# Patient Record
Sex: MALE | Race: WHITE | Employment: FULL TIME | URBAN - METROPOLITAN AREA
[De-identification: names, ages, dates, MRNs, and addresses within clinical notes are randomized per-mention and may not be internally consistent; named-entity substitution may affect disease eponyms.]

---

## 2017-03-28 ENCOUNTER — TELEPHONE (OUTPATIENT)
Dept: FAMILY MEDICINE CLINIC | Facility: CLINIC | Age: 52
End: 2017-03-28

## 2017-03-28 NOTE — TELEPHONE ENCOUNTER
Wife called to schedule  an appointment for hemorrhoids. I explained that Dr Po Saavedra is out of the office this week. Wife wants to know if the walk in clinic could help with something like this?

## 2017-03-28 NOTE — TELEPHONE ENCOUNTER
Spoke to wife /patient is in [place]. He has painful Hemorrhoids external x 2. Advised can go to . Can also try OTC Preparation H is doing this already helping somewhat. . Take sitz bath  Th. OTC pain Rx. Get hemorrhoid pillow for plane ride.      Future Appoi

## 2017-04-02 ENCOUNTER — HOSPITAL ENCOUNTER (EMERGENCY)
Facility: HOSPITAL | Age: 52
Discharge: HOME | End: 2017-04-02
Attending: EMERGENCY MEDICINE
Payer: COMMERCIAL

## 2017-04-02 VITALS
OXYGEN SATURATION: 99 % | TEMPERATURE: 99 F | BODY MASS INDEX: 24.11 KG/M2 | DIASTOLIC BLOOD PRESSURE: 81 MMHG | SYSTOLIC BLOOD PRESSURE: 125 MMHG | RESPIRATION RATE: 16 BRPM | WEIGHT: 150 LBS | HEIGHT: 66 IN | HEART RATE: 74 BPM

## 2017-04-02 DIAGNOSIS — K64.5 THROMBOSED EXTERNAL HEMORRHOID: Primary | ICD-10-CM

## 2017-04-02 PROCEDURE — 99283 EMERGENCY DEPT VISIT LOW MDM: CPT

## 2017-04-02 PROCEDURE — 36415 COLL VENOUS BLD VENIPUNCTURE: CPT

## 2017-04-02 PROCEDURE — 85027 COMPLETE CBC AUTOMATED: CPT | Performed by: EMERGENCY MEDICINE

## 2017-04-02 NOTE — ED PROVIDER NOTES
Patient Seen in: [facility] Emergency Department    History   Patient presents with:  Rectal Problem    Stated Complaint: hemorrohids    HPI    Patient is a 68-year-old with a history of asthma and hemorrhoids who presents for evaluation of a bleedi systems are as noted in HPI. Constitutional and vital signs reviewed. All other systems reviewed and negative except as noted above. PSFH elements reviewed from today and agreed except as otherwise stated in HPI.     Physical Exam       ED Triage V 10599  [phone]    Schedule an appointment as soon as possible for a visit in 2 days        Medications Prescribed:  Discharge Medication List as of 4/2/2017  4:04 PM

## 2017-04-03 ENCOUNTER — OFFICE VISIT (OUTPATIENT)
Dept: SURGERY | Facility: CLINIC | Age: 52
End: 2017-04-03

## 2017-04-03 VITALS
WEIGHT: 152 LBS | HEART RATE: 82 BPM | HEIGHT: 66 IN | DIASTOLIC BLOOD PRESSURE: 76 MMHG | SYSTOLIC BLOOD PRESSURE: 116 MMHG | BODY MASS INDEX: 24.43 KG/M2

## 2017-04-03 DIAGNOSIS — K64.2 PROLAPSED INTERNAL HEMORRHOIDS, GRADE 3: ICD-10-CM

## 2017-04-03 DIAGNOSIS — K92.2 INTESTINAL BLEEDING: ICD-10-CM

## 2017-04-03 DIAGNOSIS — K64.5 THROMBOSED EXTERNAL HEMORRHOID: Primary | ICD-10-CM

## 2017-04-03 DIAGNOSIS — R19.5 NARROWING OF STOOLS: ICD-10-CM

## 2017-04-03 PROCEDURE — 46600 DIAGNOSTIC ANOSCOPY SPX: CPT | Performed by: COLON & RECTAL SURGERY

## 2017-04-03 PROCEDURE — 99243 OFF/OP CNSLTJ NEW/EST LOW 30: CPT | Performed by: COLON & RECTAL SURGERY

## 2017-04-03 RX ORDER — POLYETHYLENE GLYCOL 3350, SODIUM CHLORIDE, SODIUM BICARBONATE, POTASSIUM CHLORIDE 420; 11.2; 5.72; 1.48 G/4L; G/4L; G/4L; G/4L
POWDER, FOR SOLUTION ORAL
Qty: 1 BOTTLE | Refills: 0 | Status: SHIPPED | OUTPATIENT
Start: 2017-04-03 | End: 2017-10-03 | Stop reason: ALTCHOICE

## 2017-04-03 NOTE — PATIENT INSTRUCTIONS
This patient presents with intestinal bleeding, a large thrombosed hemorrhoid, and change in bowel habits. He states that he was seen in emergency department on April 2. He was noted to have a thrombosed hemorrhoid.   His symptoms preceded his ER visit has a very large prolapsed external thrombosed hemorrhoid with central necrosis. It should reduce and repair itself on its own without surgical intervention. He is already nearly asymptomatic.   He has large grade 3 internal hemorrhoids which will also re

## 2017-04-03 NOTE — H&P
New Patient Visit Note       Active Problems      1. Thrombosed external hemorrhoid    2. Prolapsed internal hemorrhoids, grade 3    3. Narrowing of stools    4.  Intestinal bleeding        Chief Complaint   Patient presents with:  Hemorrhoids: New pt. seen Known Allergies. Past Medical / Surgical / Social / Family History    The past medical and past surgical history have been reviewed by me today.     Past Medical History   Diagnosis Date   • Asthma      as child   • Nonspecific (abnormal) findings on rad fever, chills, diaphoresis, fatigue and unexpected weight change. HENT: Negative for hearing loss, nosebleeds, sore throat and trouble swallowing. Respiratory: Negative for apnea, cough, shortness of breath and wheezing.     Cardiovascular: Negative fo no fissures or fistulae. There is no evidence of anal abscess. This patient has a very large prolapsed external thrombosed hemorrhoid with central necrosis. It should reduce and repair itself on its own without surgical intervention.   He is already ne the anoderm with a central necrotic zone that has a blood clot extruding. I took the opportunity today to try to express more of the blood clot from the defect, I yielded minimal results.   He has a anoscopy revealing internal components in the left latera

## 2017-04-03 NOTE — PROCEDURES
Procedure:  Anoscopy    Surgeon: Katy Young    Anesthesia: None    Findings: See the progress note attached for all findings    Operative Summary: The patient was placed in a prone position on the proctoscopy table, the hips were flexed in the jackknife p

## 2017-07-26 ENCOUNTER — MED REC SCAN ONLY (OUTPATIENT)
Dept: FAMILY MEDICINE CLINIC | Facility: CLINIC | Age: 52
End: 2017-07-26

## 2017-09-25 ENCOUNTER — TELEPHONE (OUTPATIENT)
Dept: FAMILY MEDICINE CLINIC | Facility: CLINIC | Age: 52
End: 2017-09-25

## 2017-09-25 NOTE — TELEPHONE ENCOUNTER
Patient requests an appt for an annual physical.  I explained that his last physical was in 12/2016 and insurance doesn't usually cover it until 12/2017.   He states his employer told him if he doesn't have it done by 9/29/17, he will not qualify for discou

## 2017-09-25 NOTE — TELEPHONE ENCOUNTER
Called Pt to inform that labs were ordered last December and never completed    Pt can have these done anytime (fasting)    Pt also given # to fax us the form required by his job to see if we can complete using the physical he had last December    Will not

## 2017-09-28 ENCOUNTER — LAB ENCOUNTER (OUTPATIENT)
Dept: LAB | Age: 52
End: 2017-09-28
Attending: FAMILY MEDICINE
Payer: COMMERCIAL

## 2017-09-28 DIAGNOSIS — Z13.220 SCREENING FOR LIPOID DISORDERS: ICD-10-CM

## 2017-09-28 DIAGNOSIS — Z13.0 SCREENING FOR DEFICIENCY ANEMIA: ICD-10-CM

## 2017-09-28 DIAGNOSIS — Z13.1 SCREENING FOR DIABETES MELLITUS: ICD-10-CM

## 2017-09-28 DIAGNOSIS — R73.01 ELEVATED FASTING GLUCOSE: ICD-10-CM

## 2017-09-28 DIAGNOSIS — Z13.29 SCREENING FOR THYROID DISORDER: ICD-10-CM

## 2017-09-28 LAB
ALBUMIN SERPL-MCNC: 3.7 G/DL (ref 3.5–4.8)
ALP LIVER SERPL-CCNC: 78 U/L (ref 45–117)
ALT SERPL-CCNC: 26 U/L (ref 17–63)
AST SERPL-CCNC: 11 U/L (ref 15–41)
BASOPHILS # BLD AUTO: 0.06 X10(3) UL (ref 0–0.1)
BASOPHILS NFR BLD AUTO: 1.2 %
BILIRUB SERPL-MCNC: 1.4 MG/DL (ref 0.1–2)
BUN BLD-MCNC: 18 MG/DL (ref 8–20)
CALCIUM BLD-MCNC: 9.1 MG/DL (ref 8.3–10.3)
CHLORIDE: 106 MMOL/L (ref 101–111)
CHOLEST SMN-MCNC: 223 MG/DL (ref ?–200)
CO2: 28 MMOL/L (ref 22–32)
CREAT BLD-MCNC: 0.95 MG/DL (ref 0.7–1.3)
EOSINOPHIL # BLD AUTO: 0.14 X10(3) UL (ref 0–0.3)
EOSINOPHIL NFR BLD AUTO: 2.9 %
ERYTHROCYTE [DISTWIDTH] IN BLOOD BY AUTOMATED COUNT: 11.8 % (ref 11.5–16)
GLUCOSE BLD-MCNC: 108 MG/DL (ref 70–99)
HCT VFR BLD AUTO: 40.8 % (ref 37–53)
HDLC SERPL-MCNC: 40 MG/DL (ref 45–?)
HDLC SERPL: 5.58 {RATIO} (ref ?–4.97)
HGB BLD-MCNC: 13.8 G/DL (ref 13–17)
IMMATURE GRANULOCYTE COUNT: 0.01 X10(3) UL (ref 0–1)
IMMATURE GRANULOCYTE RATIO %: 0.2 %
LDLC SERPL CALC-MCNC: 142 MG/DL (ref ?–130)
LDLC SERPL-MCNC: 41 MG/DL (ref 5–40)
LYMPHOCYTES # BLD AUTO: 1.92 X10(3) UL (ref 0.9–4)
LYMPHOCYTES NFR BLD AUTO: 39.9 %
M PROTEIN MFR SERPL ELPH: 7 G/DL (ref 6.1–8.3)
MCH RBC QN AUTO: 30.7 PG (ref 27–33.2)
MCHC RBC AUTO-ENTMCNC: 33.8 G/DL (ref 31–37)
MCV RBC AUTO: 90.9 FL (ref 80–99)
MONOCYTES # BLD AUTO: 0.56 X10(3) UL (ref 0.1–0.6)
MONOCYTES NFR BLD AUTO: 11.6 %
NEUTROPHIL ABS PRELIM: 2.12 X10 (3) UL (ref 1.3–6.7)
NEUTROPHILS # BLD AUTO: 2.12 X10(3) UL (ref 1.3–6.7)
NEUTROPHILS NFR BLD AUTO: 44.2 %
NONHDLC SERPL-MCNC: 183 MG/DL (ref ?–130)
PLATELET # BLD AUTO: 343 10(3)UL (ref 150–450)
POTASSIUM SERPL-SCNC: 4.7 MMOL/L (ref 3.6–5.1)
RBC # BLD AUTO: 4.49 X10(6)UL (ref 4.3–5.7)
RED CELL DISTRIBUTION WIDTH-SD: 39.2 FL (ref 35.1–46.3)
SODIUM SERPL-SCNC: 140 MMOL/L (ref 136–144)
TRIGLYCERIDES: 203 MG/DL (ref ?–150)
TSI SER-ACNC: 1.18 MIU/ML (ref 0.35–5.5)
WBC # BLD AUTO: 4.8 X10(3) UL (ref 4–13)

## 2017-09-28 PROCEDURE — 84443 ASSAY THYROID STIM HORMONE: CPT

## 2017-09-28 PROCEDURE — 80061 LIPID PANEL: CPT

## 2017-09-28 PROCEDURE — 83036 HEMOGLOBIN GLYCOSYLATED A1C: CPT

## 2017-09-28 PROCEDURE — 80053 COMPREHEN METABOLIC PANEL: CPT

## 2017-09-28 PROCEDURE — 36415 COLL VENOUS BLD VENIPUNCTURE: CPT

## 2017-09-28 PROCEDURE — 85025 COMPLETE CBC W/AUTO DIFF WBC: CPT

## 2017-09-28 NOTE — TELEPHONE ENCOUNTER
Patient called to let us know he had labs drawn this morning. I told him I can see that labs were collected, but it may take up to 48 hours to get results.       I reminded him of faxing form to us and letting us know where we need to send the form once co

## 2017-09-29 ENCOUNTER — MED REC SCAN ONLY (OUTPATIENT)
Dept: FAMILY MEDICINE CLINIC | Facility: CLINIC | Age: 52
End: 2017-09-29

## 2017-09-29 DIAGNOSIS — R73.01 ELEVATED FASTING GLUCOSE: Primary | ICD-10-CM

## 2017-09-29 LAB
EST. AVERAGE GLUCOSE BLD GHB EST-MCNC: 111 MG/DL (ref 68–126)
HBA1C MFR BLD HPLC: 5.5 % (ref ?–5.7)

## 2017-09-29 NOTE — TELEPHONE ENCOUNTER
Completed form was faxed. Dr Anabella Francis waived fee because it was documented that form was presented at  in December    Pt called and notified.  Appt made for next week to discuss lab results    Future Appointments  Date Time Provider Thea Monique   10/3/2017

## 2017-09-29 NOTE — PROGRESS NOTES
Notes Recorded by Von Lynch MD on 9/29/2017 at 3:19 PM CDT  Cholesterol is better but continues to he high, glucose elevated, please add HgbA1c and also advise patient to follow up with me to discuss his labs.     A1C added

## 2017-09-29 NOTE — TELEPHONE ENCOUNTER
Called patient and LVM asking if he could fax the form to us so we can review and possibly complete. If it meets criteria, there will be the $25 form fee. If it does not meet criteria, we will let him know.

## 2017-10-03 ENCOUNTER — OFFICE VISIT (OUTPATIENT)
Dept: FAMILY MEDICINE CLINIC | Facility: CLINIC | Age: 52
End: 2017-10-03

## 2017-10-03 VITALS
HEART RATE: 78 BPM | DIASTOLIC BLOOD PRESSURE: 62 MMHG | WEIGHT: 157.13 LBS | SYSTOLIC BLOOD PRESSURE: 118 MMHG | RESPIRATION RATE: 16 BRPM | TEMPERATURE: 98 F | BODY MASS INDEX: 25.25 KG/M2 | HEIGHT: 66 IN

## 2017-10-03 DIAGNOSIS — Z82.49 FAMILY HISTORY OF HEART DISEASE: ICD-10-CM

## 2017-10-03 DIAGNOSIS — E78.00 HYPERCHOLESTEROLEMIA: Primary | ICD-10-CM

## 2017-10-03 PROCEDURE — 99213 OFFICE O/P EST LOW 20 MIN: CPT | Performed by: FAMILY MEDICINE

## 2017-10-03 RX ORDER — ROSUVASTATIN CALCIUM 5 MG/1
5 TABLET, COATED ORAL NIGHTLY
Qty: 30 TABLET | Refills: 0 | Status: SHIPPED | OUTPATIENT
Start: 2017-10-03 | End: 2017-10-31

## 2017-10-03 NOTE — PATIENT INSTRUCTIONS
Lifestyle Changes to Control Cholesterol  You can control your cholesterol through diet, exercise, weight management, quitting smoking, stress management, and taking your medicines right. These things can also lower your risk for cardiovascular disease. · Riding a bicycle or stationary bike  · Dancing  Managing your weight  If you are overweight or obese, your healthcare provider will work with you to help you lose weight and lower your BMI (body mass index).  Making diet changes and getting more physical · Don’t skip a dose or stop taking your medicine because you feel better or because your cholesterol numbers go down. Never stop taking your medicine unless your healthcare provider has told you it’s OK.   · Ask your healthcare provider if you have any ques © 5783-4940 [address], [address] [place]. All rights reserved. This information is not intended as a substitute for professional medical care. Always follow your healthcare professional's instructions.

## 2017-10-09 NOTE — PROGRESS NOTES
Carri Tellez is a 54year old male. Patient presents with:  Test Results  Other: No flu vaccine    HPI:   Patient is seen for follow-up and to discuss his labs.   Patient states exercises by doing yoga, tries to eat mostly healthy on occasion might eat a Hematocrit      37.0 - 53.0 % 40.8   Platelet Count      537.2 - 450.0 10(3)uL 343.0   MCV      80.0 - 99.0 fL 90.9   MCH      27.0 - 33.2 pg 30.7   MCHC      31.0 - 37.0 g/dL 33.8   RDW      11.5 - 16.0 % 11.8   RDW-SD      35.1 - 46.3 fL 39.2   Prelim Rosuvastatin Calcium 5 MG Oral Tab; Take 1 tablet (5 mg total) by mouth nightly. -     HEPATIC FUNCTION PANEL (7); Future  -     LIPID PANEL; Future    Family history of heart disease  -     CT CALCIUM SCORING;  Future

## 2017-10-31 DIAGNOSIS — E78.00 HYPERCHOLESTEROLEMIA: ICD-10-CM

## 2017-11-01 RX ORDER — ROSUVASTATIN CALCIUM 5 MG/1
TABLET, COATED ORAL
Qty: 90 TABLET | Refills: 1 | Status: SHIPPED | OUTPATIENT
Start: 2017-11-01 | End: 2018-05-20

## 2017-11-01 NOTE — TELEPHONE ENCOUNTER
No future appointments. LOV 10/17 Return in about 6 months (around 4/3/2018).        LAST LAB 9/17    LAST RX  Rosuvastatin Calcium 5 MG Oral Tab 30 tablet 0 10/3/2017       PROTOCOL  Cholesterol Medication Protocol Passed    Please advise did you want

## 2018-03-10 ENCOUNTER — OFFICE VISIT (OUTPATIENT)
Dept: FAMILY MEDICINE CLINIC | Facility: CLINIC | Age: 53
End: 2018-03-10

## 2018-03-10 VITALS
SYSTOLIC BLOOD PRESSURE: 118 MMHG | WEIGHT: 144 LBS | HEART RATE: 74 BPM | TEMPERATURE: 98 F | BODY MASS INDEX: 23.14 KG/M2 | HEIGHT: 66 IN | RESPIRATION RATE: 16 BRPM | DIASTOLIC BLOOD PRESSURE: 58 MMHG | OXYGEN SATURATION: 99 %

## 2018-03-10 DIAGNOSIS — L30.9 DERMATITIS: Primary | ICD-10-CM

## 2018-03-10 PROCEDURE — 99213 OFFICE O/P EST LOW 20 MIN: CPT | Performed by: FAMILY MEDICINE

## 2018-03-10 RX ORDER — MOMETASONE FUROATE 1 MG/G
1 CREAM TOPICAL 2 TIMES DAILY PRN
Qty: 45 G | Refills: 1 | Status: SHIPPED | OUTPATIENT
Start: 2018-03-10 | End: 2018-09-25 | Stop reason: ALTCHOICE

## 2018-03-10 NOTE — PATIENT INSTRUCTIONS
Apply mometasone cream to affected areas twice daily for 1-2 weeks. If no better, follow-up with PCP for further evaluation and consider dietary changes to help weed out what may be causing the reaction.

## 2018-03-10 NOTE — PROGRESS NOTES
CHIEF COMPLAINT:   Patient presents with:  Rash: itchy rash on arms , legs x 1 mo . tried otc cortisone topical ointment          HPI:   Jayy Welch is a 50year old male who presents for evaluation of a rash.   Per patient rash started in the past 1 mon as child   • Nonspecific (abnormal) findings on radiological and other examination of other intrathoracic organs       Past Surgical History:  07/18/2017: COLONOSCOPY      Comment: internal hemorrhoids and diverticula, recheck                in 10 yrs. Oropharynx moist without lesions. LUNGS: Clear to auscultation bilaterally. No wheezing, rhonchi, or rales. No diminished breath sounds. No increased work of breathing. CARDIO: RRR without murmur  LYMPH: No lymphadenopathy.      ASSESSMENT AND PLAN:

## 2018-05-20 DIAGNOSIS — E78.00 HYPERCHOLESTEROLEMIA: ICD-10-CM

## 2018-05-21 RX ORDER — ROSUVASTATIN CALCIUM 5 MG/1
TABLET, COATED ORAL
Qty: 90 TABLET | Refills: 0 | Status: SHIPPED | OUTPATIENT
Start: 2018-05-21 | End: 2018-09-10

## 2018-05-21 NOTE — TELEPHONE ENCOUNTER
LOV 10/17 No future appointments. LAST LAB 9/17    LAST RX   ROSUVASTATIN CALCIUM 5 MG Oral Tab 90 tablet 1 11/1/2017       PROTOCOL  Cholesterol Medication Protocol Passed    Refilled x 3 months.

## 2018-09-10 DIAGNOSIS — E78.00 HYPERCHOLESTEROLEMIA: ICD-10-CM

## 2018-09-10 RX ORDER — ROSUVASTATIN CALCIUM 5 MG/1
TABLET, COATED ORAL
Qty: 30 TABLET | Refills: 0 | Status: SHIPPED | OUTPATIENT
Start: 2018-09-10 | End: 2018-09-25

## 2018-09-10 NOTE — TELEPHONE ENCOUNTER
Name from pharmacy: ROSUVASTATIN 5MG TABLETS         Will file in chart as: ROSUVASTATIN CALCIUM 5 MG Oral Tab    Sig: TAKE 1 TABLET(5 MG) BY MOUTH EVERY NIGHT    Disp:  90 tablet    Refills:  0    Start: 9/10/2018     Class: Normal    For: Hypercholester

## 2018-09-25 ENCOUNTER — OFFICE VISIT (OUTPATIENT)
Dept: FAMILY MEDICINE CLINIC | Facility: CLINIC | Age: 53
End: 2018-09-25
Payer: COMMERCIAL

## 2018-09-25 VITALS
SYSTOLIC BLOOD PRESSURE: 120 MMHG | OXYGEN SATURATION: 97 % | WEIGHT: 148.25 LBS | DIASTOLIC BLOOD PRESSURE: 70 MMHG | TEMPERATURE: 97 F | BODY MASS INDEX: 23.83 KG/M2 | RESPIRATION RATE: 18 BRPM | HEIGHT: 66 IN | HEART RATE: 68 BPM

## 2018-09-25 DIAGNOSIS — J45.20 ASTHMA, MILD INTERMITTENT, WELL-CONTROLLED: ICD-10-CM

## 2018-09-25 DIAGNOSIS — Z00.00 ROUTINE GENERAL MEDICAL EXAMINATION AT A HEALTH CARE FACILITY: Primary | ICD-10-CM

## 2018-09-25 DIAGNOSIS — E78.00 HYPERCHOLESTEROLEMIA: ICD-10-CM

## 2018-09-25 DIAGNOSIS — Z12.5 SCREENING FOR PROSTATE CANCER: ICD-10-CM

## 2018-09-25 DIAGNOSIS — Z28.21 REFUSED INFLUENZA VACCINE: ICD-10-CM

## 2018-09-25 PROBLEM — R19.5 NARROWING OF STOOLS: Status: RESOLVED | Noted: 2017-04-03 | Resolved: 2018-09-25

## 2018-09-25 PROBLEM — K92.2 INTESTINAL BLEEDING: Status: RESOLVED | Noted: 2017-04-03 | Resolved: 2018-09-25

## 2018-09-25 PROCEDURE — 99396 PREV VISIT EST AGE 40-64: CPT | Performed by: FAMILY MEDICINE

## 2018-09-25 RX ORDER — ROSUVASTATIN CALCIUM 5 MG/1
TABLET, COATED ORAL
Qty: 90 TABLET | Refills: 3 | Status: SHIPPED | OUTPATIENT
Start: 2018-09-25 | End: 2019-09-25

## 2018-09-25 NOTE — PROGRESS NOTES
Narda Edmonds is a 50year old male   Patient presents with:   Well Adult: Physical and screening lab  Complete Form: Biometric form  Other: waist cir# 33.75  Medication Follow-Up: Rosuvastatin refill    HPI:   Patient is seen for his annual physical and n yes yoga     Social History Narrative     REVIEW OF SYSTEMS:   Constitutional: no change in weight or appetite. No fever, fatigue or myalgias. Eye: No change in vision, no discharge, itching or dryness. ENT: No earache or change in hearing.  No nasal fausto Lips, mucosa, and tongue normal; teeth and gums normal   Neck:   Supple, symmetrical, trachea midline, no adenopathy;        thyroid:  No enlargement/tenderness/nodules   Back:     Symmetric, no curvature, ROM normal, no CVA tenderness, no percussion tende

## 2018-09-28 ENCOUNTER — LAB ENCOUNTER (OUTPATIENT)
Dept: LAB | Age: 53
End: 2018-09-28
Attending: FAMILY MEDICINE
Payer: COMMERCIAL

## 2018-09-28 DIAGNOSIS — Z12.5 SCREENING FOR PROSTATE CANCER: ICD-10-CM

## 2018-09-28 DIAGNOSIS — Z00.00 ROUTINE GENERAL MEDICAL EXAMINATION AT A HEALTH CARE FACILITY: ICD-10-CM

## 2018-09-28 DIAGNOSIS — E78.00 HYPERCHOLESTEROLEMIA: ICD-10-CM

## 2018-09-28 LAB
ALBUMIN SERPL-MCNC: 3.8 G/DL (ref 3.5–4.8)
ALBUMIN/GLOB SERPL: 1.3 {RATIO} (ref 1–2)
ALP LIVER SERPL-CCNC: 66 U/L (ref 45–117)
ALT SERPL-CCNC: 22 U/L (ref 17–63)
ANION GAP SERPL CALC-SCNC: 6 MMOL/L (ref 0–18)
AST SERPL-CCNC: 12 U/L (ref 15–41)
BASOPHILS # BLD AUTO: 0.05 X10(3) UL (ref 0–0.1)
BASOPHILS NFR BLD AUTO: 0.9 %
BILIRUB DIRECT SERPL-MCNC: 0.2 MG/DL (ref 0.1–0.5)
BILIRUB SERPL-MCNC: 1 MG/DL (ref 0.1–2)
BUN BLD-MCNC: 18 MG/DL (ref 8–20)
BUN/CREAT SERPL: 18.9 (ref 10–20)
CALCIUM BLD-MCNC: 9.1 MG/DL (ref 8.3–10.3)
CHLORIDE SERPL-SCNC: 107 MMOL/L (ref 101–111)
CHOLEST SMN-MCNC: 150 MG/DL (ref ?–200)
CO2 SERPL-SCNC: 28 MMOL/L (ref 22–32)
COMPLEXED PSA SERPL-MCNC: 2.91 NG/ML (ref 0.01–4)
CREAT BLD-MCNC: 0.95 MG/DL (ref 0.7–1.3)
EOSINOPHIL # BLD AUTO: 0.14 X10(3) UL (ref 0–0.3)
EOSINOPHIL NFR BLD AUTO: 2.5 %
ERYTHROCYTE [DISTWIDTH] IN BLOOD BY AUTOMATED COUNT: 12.1 % (ref 11.5–16)
GLOBULIN PLAS-MCNC: 3 G/DL (ref 2.5–4)
GLUCOSE BLD-MCNC: 102 MG/DL (ref 70–99)
HCT VFR BLD AUTO: 42.5 % (ref 37–53)
HDLC SERPL-MCNC: 48 MG/DL (ref 40–59)
HGB BLD-MCNC: 14.2 G/DL (ref 13–17)
IMMATURE GRANULOCYTE COUNT: 0.01 X10(3) UL (ref 0–1)
IMMATURE GRANULOCYTE RATIO %: 0.2 %
LDLC SERPL CALC-MCNC: 78 MG/DL (ref ?–100)
LYMPHOCYTES # BLD AUTO: 1.94 X10(3) UL (ref 0.9–4)
LYMPHOCYTES NFR BLD AUTO: 34.6 %
M PROTEIN MFR SERPL ELPH: 6.8 G/DL (ref 6.1–8.3)
MCH RBC QN AUTO: 31.5 PG (ref 27–33.2)
MCHC RBC AUTO-ENTMCNC: 33.4 G/DL (ref 31–37)
MCV RBC AUTO: 94.2 FL (ref 80–99)
MONOCYTES # BLD AUTO: 0.54 X10(3) UL (ref 0.1–1)
MONOCYTES NFR BLD AUTO: 9.6 %
NEUTROPHIL ABS PRELIM: 2.92 X10 (3) UL (ref 1.3–6.7)
NEUTROPHILS # BLD AUTO: 2.92 X10(3) UL (ref 1.3–6.7)
NEUTROPHILS NFR BLD AUTO: 52.2 %
NONHDLC SERPL-MCNC: 102 MG/DL (ref ?–130)
OSMOLALITY SERPL CALC.SUM OF ELEC: 294 MOSM/KG (ref 275–295)
PLATELET # BLD AUTO: 338 10(3)UL (ref 150–450)
POTASSIUM SERPL-SCNC: 4.3 MMOL/L (ref 3.6–5.1)
RBC # BLD AUTO: 4.51 X10(6)UL (ref 4.3–5.7)
RED CELL DISTRIBUTION WIDTH-SD: 41.7 FL (ref 35.1–46.3)
SODIUM SERPL-SCNC: 141 MMOL/L (ref 136–144)
TRIGL SERPL-MCNC: 122 MG/DL (ref 30–149)
TSI SER-ACNC: 1.57 MIU/ML (ref 0.35–5.5)
VLDLC SERPL CALC-MCNC: 24 MG/DL (ref 0–30)
WBC # BLD AUTO: 5.6 X10(3) UL (ref 4–13)

## 2018-09-28 PROCEDURE — 82248 BILIRUBIN DIRECT: CPT

## 2018-09-28 PROCEDURE — 80053 COMPREHEN METABOLIC PANEL: CPT

## 2018-09-28 PROCEDURE — 84443 ASSAY THYROID STIM HORMONE: CPT

## 2018-09-28 PROCEDURE — 80061 LIPID PANEL: CPT

## 2018-09-28 PROCEDURE — 85025 COMPLETE CBC W/AUTO DIFF WBC: CPT

## 2018-10-01 ENCOUNTER — PATIENT MESSAGE (OUTPATIENT)
Dept: FAMILY MEDICINE CLINIC | Facility: CLINIC | Age: 53
End: 2018-10-01

## 2018-10-02 NOTE — TELEPHONE ENCOUNTER
From: Jacinta Velasquez  To: Mary Diaz MD  Sent: 10/1/2018 10:31 PM CDT  Subject: Non-Urgent Medical Question    Dr. Smiley Chong like much better results this time, especially cholesterol to 150 from 223.     Glucose fell from 108 to 102, but higher

## 2018-10-13 ENCOUNTER — HOSPITAL ENCOUNTER (EMERGENCY)
Facility: HOSPITAL | Age: 53
Discharge: HOME | End: 2018-10-14
Attending: EMERGENCY MEDICINE
Payer: COMMERCIAL

## 2018-10-13 ENCOUNTER — APPOINTMENT (OUTPATIENT)
Dept: CT IMAGING | Facility: HOSPITAL | Age: 53
End: 2018-10-13
Attending: EMERGENCY MEDICINE
Payer: COMMERCIAL

## 2018-10-13 VITALS
HEIGHT: 66 IN | TEMPERATURE: 97 F | DIASTOLIC BLOOD PRESSURE: 77 MMHG | HEART RATE: 64 BPM | OXYGEN SATURATION: 100 % | SYSTOLIC BLOOD PRESSURE: 119 MMHG | WEIGHT: 145 LBS | BODY MASS INDEX: 23.3 KG/M2 | RESPIRATION RATE: 15 BRPM

## 2018-10-13 DIAGNOSIS — H81.399 PERIPHERAL VERTIGO, UNSPECIFIED LATERALITY: Primary | ICD-10-CM

## 2018-10-13 PROCEDURE — 99284 EMERGENCY DEPT VISIT MOD MDM: CPT

## 2018-10-13 PROCEDURE — 96361 HYDRATE IV INFUSION ADD-ON: CPT

## 2018-10-13 PROCEDURE — 85025 COMPLETE CBC W/AUTO DIFF WBC: CPT | Performed by: EMERGENCY MEDICINE

## 2018-10-13 PROCEDURE — 96360 HYDRATION IV INFUSION INIT: CPT

## 2018-10-13 PROCEDURE — 80048 BASIC METABOLIC PNL TOTAL CA: CPT | Performed by: EMERGENCY MEDICINE

## 2018-10-13 PROCEDURE — 70496 CT ANGIOGRAPHY HEAD: CPT | Performed by: EMERGENCY MEDICINE

## 2018-10-13 PROCEDURE — 70498 CT ANGIOGRAPHY NECK: CPT | Performed by: EMERGENCY MEDICINE

## 2018-10-13 RX ORDER — MECLIZINE HYDROCHLORIDE 25 MG/1
25 TABLET ORAL 3 TIMES DAILY PRN
Qty: 20 TABLET | Refills: 0 | Status: SHIPPED | OUTPATIENT
Start: 2018-10-13 | End: 2018-10-18

## 2018-10-13 RX ORDER — SODIUM CHLORIDE 9 MG/ML
125 INJECTION, SOLUTION INTRAVENOUS CONTINUOUS
Status: DISCONTINUED | OUTPATIENT
Start: 2018-10-13 | End: 2018-10-14

## 2018-10-13 RX ORDER — ONDANSETRON 8 MG/1
8 TABLET, ORALLY DISINTEGRATING ORAL EVERY 6 HOURS PRN
Qty: 10 TABLET | Refills: 0 | Status: SHIPPED | OUTPATIENT
Start: 2018-10-13 | End: 2018-10-20
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
## 2018-10-17 ENCOUNTER — PATIENT MESSAGE (OUTPATIENT)
Dept: FAMILY MEDICINE CLINIC | Facility: CLINIC | Age: 53
End: 2018-10-17

## 2018-10-17 NOTE — TELEPHONE ENCOUNTER
From: Jeffrey Mancuso  To: Supriya Olmedo MD  Sent: 10/17/2018 12:04 PM CDT  Subject: Visit Follow-up Question    Dr. Cecilia Del Rosario --    Wanted to provide some background on visit tomorrow. Weekend was hard, and suffered from severe dizzy spells.  Rayray Clark took me to

## 2018-10-18 ENCOUNTER — OFFICE VISIT (OUTPATIENT)
Dept: FAMILY MEDICINE CLINIC | Facility: CLINIC | Age: 53
End: 2018-10-18
Payer: COMMERCIAL

## 2018-10-18 VITALS
SYSTOLIC BLOOD PRESSURE: 110 MMHG | TEMPERATURE: 98 F | BODY MASS INDEX: 24 KG/M2 | HEART RATE: 78 BPM | WEIGHT: 149.13 LBS | RESPIRATION RATE: 18 BRPM | OXYGEN SATURATION: 98 % | DIASTOLIC BLOOD PRESSURE: 64 MMHG

## 2018-10-18 DIAGNOSIS — H81.13 BPPV (BENIGN PAROXYSMAL POSITIONAL VERTIGO), BILATERAL: Primary | ICD-10-CM

## 2018-10-18 PROCEDURE — 99213 OFFICE O/P EST LOW 20 MIN: CPT | Performed by: FAMILY MEDICINE

## 2018-10-18 RX ORDER — MECLIZINE HYDROCHLORIDE 25 MG/1
25 TABLET ORAL 3 TIMES DAILY PRN
Qty: 30 TABLET | Refills: 0 | Status: SHIPPED | OUTPATIENT
Start: 2018-10-18 | End: 2018-12-17
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
## 2018-10-18 NOTE — PATIENT INSTRUCTIONS
Inner Ear Problems: Causes of Dizziness (Vertigo)       Benign positional vertigo (BPV)  This is the most common cause of vertigo. BPV is also called benign positional paroxysmal vertigo (BPPV).  It happens when crystals in the ear canals shift into the w © 6654-6720 [address] [facility], [facility] [place]. All rights reserved. This information is not intended as a substitute for professional medical care. Always follow your healthcare professional's instructions.

## 2018-12-12 ENCOUNTER — TELEPHONE (OUTPATIENT)
Dept: FAMILY MEDICINE CLINIC | Facility: CLINIC | Age: 53
End: 2018-12-12

## 2018-12-12 NOTE — TELEPHONE ENCOUNTER
LOV 10/18 for ER f/u    Spoke to spouse patient is out of town. But is having vertigo and nausea again. He has the medication that was given to him before and is taking it. Gave appointment when he is back. Did not injure his head in any way.      P

## 2018-12-17 ENCOUNTER — OFFICE VISIT (OUTPATIENT)
Dept: FAMILY MEDICINE CLINIC | Facility: CLINIC | Age: 53
End: 2018-12-17
Payer: COMMERCIAL

## 2018-12-17 VITALS
BODY MASS INDEX: 23.84 KG/M2 | TEMPERATURE: 98 F | WEIGHT: 148.38 LBS | SYSTOLIC BLOOD PRESSURE: 110 MMHG | OXYGEN SATURATION: 97 % | RESPIRATION RATE: 18 BRPM | HEART RATE: 84 BPM | DIASTOLIC BLOOD PRESSURE: 64 MMHG | HEIGHT: 66 IN

## 2018-12-17 DIAGNOSIS — H81.12 BPPV (BENIGN PAROXYSMAL POSITIONAL VERTIGO), LEFT: Primary | ICD-10-CM

## 2018-12-17 PROCEDURE — 99213 OFFICE O/P EST LOW 20 MIN: CPT | Performed by: FAMILY MEDICINE

## 2018-12-17 RX ORDER — MECLIZINE HYDROCHLORIDE 25 MG/1
25 TABLET ORAL 3 TIMES DAILY PRN
Qty: 30 TABLET | Refills: 0 | Status: SHIPPED | OUTPATIENT
Start: 2018-12-17 | End: 2018-12-27

## 2018-12-17 NOTE — PROGRESS NOTES
Татьяна Ng is a 48year old male. Patient presents with:  Dizziness: States it has gotten better here for f/u. HPI:   Patient is seen today complaining of vertigo.   States was in 2-hour [place] last week and it was very dry and experienced an episod for this visit:    BPPV (benign paroxysmal positional vertigo), left  -     Meclizine HCl 25 MG Oral Tab; Take 1 tablet (25 mg total) by mouth 3 (three) times daily as needed.     Reassured patient, advised to take medication as needed and continue the exer

## 2019-09-25 DIAGNOSIS — E78.00 HYPERCHOLESTEROLEMIA: ICD-10-CM

## 2019-09-26 RX ORDER — ROSUVASTATIN CALCIUM 5 MG/1
TABLET, COATED ORAL
Qty: 30 TABLET | Refills: 0 | Status: SHIPPED | OUTPATIENT
Start: 2019-09-26 | End: 2019-11-06

## 2019-09-26 NOTE — TELEPHONE ENCOUNTER
LOV 12/18 acute PE 9/18 Due for PE now. LAST LAB 9/18    LAST RX   Rosuvastatin Calcium 5 MG Oral Tab 90 tablet 3 9/25/2018       Next OV Visit date not found      PROTOCOL  Cholesterol Medication Protocol Failed    Refilled x 30 days.      Mychart to jono

## 2019-11-05 ENCOUNTER — APPOINTMENT (OUTPATIENT)
Dept: LAB | Age: 54
End: 2019-11-05
Attending: FAMILY MEDICINE
Payer: COMMERCIAL

## 2019-11-06 ENCOUNTER — OFFICE VISIT (OUTPATIENT)
Dept: FAMILY MEDICINE CLINIC | Facility: CLINIC | Age: 54
End: 2019-11-06
Payer: COMMERCIAL

## 2019-11-06 VITALS
OXYGEN SATURATION: 98 % | HEART RATE: 68 BPM | TEMPERATURE: 97 F | RESPIRATION RATE: 18 BRPM | DIASTOLIC BLOOD PRESSURE: 64 MMHG | WEIGHT: 153 LBS | SYSTOLIC BLOOD PRESSURE: 102 MMHG | HEIGHT: 66 IN | BODY MASS INDEX: 24.59 KG/M2

## 2019-11-06 DIAGNOSIS — Z28.21 REFUSED INFLUENZA VACCINE: ICD-10-CM

## 2019-11-06 DIAGNOSIS — E78.00 HYPERCHOLESTEROLEMIA: ICD-10-CM

## 2019-11-06 DIAGNOSIS — Z00.00 ROUTINE GENERAL MEDICAL EXAMINATION AT A HEALTH CARE FACILITY: Primary | ICD-10-CM

## 2019-11-06 DIAGNOSIS — J45.20 MILD INTERMITTENT ASTHMA WITHOUT COMPLICATION: ICD-10-CM

## 2019-11-06 PROBLEM — K64.2 PROLAPSED INTERNAL HEMORRHOIDS, GRADE 3: Status: RESOLVED | Noted: 2017-04-03 | Resolved: 2019-11-06

## 2019-11-06 PROBLEM — K64.5 THROMBOSED EXTERNAL HEMORRHOID: Status: RESOLVED | Noted: 2017-04-03 | Resolved: 2019-11-06

## 2019-11-06 PROBLEM — H81.12 BPPV (BENIGN PAROXYSMAL POSITIONAL VERTIGO), LEFT: Status: RESOLVED | Noted: 2018-12-17 | Resolved: 2019-11-06

## 2019-11-06 PROCEDURE — 99396 PREV VISIT EST AGE 40-64: CPT | Performed by: FAMILY MEDICINE

## 2019-11-06 RX ORDER — ROSUVASTATIN CALCIUM 5 MG/1
TABLET, COATED ORAL
Qty: 90 TABLET | Refills: 1 | Status: SHIPPED | OUTPATIENT
Start: 2019-11-06 | End: 2020-06-05

## 2019-11-06 RX ORDER — ALBUTEROL SULFATE 90 UG/1
2 AEROSOL, METERED RESPIRATORY (INHALATION) EVERY 6 HOURS PRN
Qty: 1 INHALER | Refills: 0 | Status: SHIPPED | OUTPATIENT
Start: 2019-11-06 | End: 2020-03-12

## 2019-11-06 NOTE — PROGRESS NOTES
Som Raymundo is a 48year old male   Patient presents with:  Physical: refill on meds and paperwork completed.  AAP routed     HPI:   Patient is seen for his annual physical and needs his biometric form filled  Colonoscopy done in 2017 was normal  Patient Smoker     Smokeless tobacco: Never Used    Alcohol Use: Yes  0.0 oz/week    0 Standard drinks or equivalent per week         Comment: 2x wk beer and wine    Drug Use: No    Sexual Activity: Yes     Other Topics Concern    Caffeine Concern Yes    Comment: lb (69.4 kg), SpO2 98 %. Physical Exam    Constitutional: He is oriented to person, place, and time. He appears well-developed and well-nourished. No distress. HENT:   Head: Normocephalic and atraumatic.    Right Ear: External ear normal.   Left Ear: E

## 2019-11-08 ENCOUNTER — TELEPHONE (OUTPATIENT)
Dept: FAMILY MEDICINE CLINIC | Facility: CLINIC | Age: 54
End: 2019-11-08

## 2019-11-25 ENCOUNTER — E-VISIT (OUTPATIENT)
Dept: FAMILY MEDICINE CLINIC | Facility: CLINIC | Age: 54
End: 2019-11-25

## 2019-11-25 DIAGNOSIS — R19.7 DIARRHEA OF PRESUMED INFECTIOUS ORIGIN: Primary | ICD-10-CM

## 2019-11-25 PROCEDURE — 98969 ONLINE SERVICE BY HC PRO: CPT | Performed by: NURSE PRACTITIONER

## 2019-11-25 NOTE — PATIENT INSTRUCTIONS
Treating Diarrhea    Diarrhea happens when you have loose, watery, or frequent bowel movements. It is a common problem with many causes. Most cases of diarrhea clear up on their own. But certain cases may need treatment.  Be sure to see your healthcare pr © 3323-3152 [address]. [address] [facility], [facility] [place]. All rights reserved. This information is not intended as a substitute for professional medical care. Always follow your healthcare professional's instructions.

## 2019-11-25 NOTE — PROGRESS NOTES
Pt reports diarrhea that started after consumption of a burger from a restaurant. He denies vomiting, blood or pus in stool, stools are at times semi-formed. Treating sx with imodium and probiotics.  Encouraged to continue current regimen, f/u in a few days

## 2020-03-12 ENCOUNTER — OFFICE VISIT (OUTPATIENT)
Dept: FAMILY MEDICINE CLINIC | Facility: CLINIC | Age: 55
End: 2020-03-12
Payer: COMMERCIAL

## 2020-03-12 VITALS
HEART RATE: 68 BPM | SYSTOLIC BLOOD PRESSURE: 112 MMHG | OXYGEN SATURATION: 98 % | HEIGHT: 66 IN | BODY MASS INDEX: 25.07 KG/M2 | TEMPERATURE: 98 F | DIASTOLIC BLOOD PRESSURE: 80 MMHG | WEIGHT: 156 LBS | RESPIRATION RATE: 18 BRPM

## 2020-03-12 DIAGNOSIS — J45.30 MILD PERSISTENT ASTHMA WITHOUT COMPLICATION: ICD-10-CM

## 2020-03-12 DIAGNOSIS — J06.9 VIRAL UPPER RESPIRATORY TRACT INFECTION: Primary | ICD-10-CM

## 2020-03-12 DIAGNOSIS — J20.9 ACUTE BRONCHITIS, UNSPECIFIED ORGANISM: ICD-10-CM

## 2020-03-12 PROCEDURE — 99213 OFFICE O/P EST LOW 20 MIN: CPT | Performed by: FAMILY MEDICINE

## 2020-03-12 RX ORDER — LEVALBUTEROL TARTRATE 45 UG/1
2 AEROSOL, METERED ORAL EVERY 6 HOURS PRN
Qty: 1 INHALER | Refills: 0 | Status: SHIPPED | OUTPATIENT
Start: 2020-03-12 | End: 2020-03-22

## 2020-03-12 RX ORDER — PREDNISONE 20 MG/1
TABLET ORAL
Qty: 10 TABLET | Refills: 0 | Status: SHIPPED | OUTPATIENT
Start: 2020-03-12 | End: 2020-06-05 | Stop reason: ALTCHOICE

## 2020-03-12 NOTE — PROGRESS NOTES
Christal Hale is a 48year old male. Patient presents with:  Asthma  Cough    HPI:   Christal Hale is a 48year old male with asthma complaining of cold symptoms for the past 3 days, daughter and wife are sick.  Sates since yesterday cough is worse and h cough.    Diagnoses and all orders for this visit:    Viral upper respiratory tract infection    Acute bronchitis, unspecified organism  -     predniSONE 20 MG Oral Tab; 2 tablets daily  -     Levalbuterol Tartrate (XOPENEX HFA) 45 MCG/ACT Inhalation Aeros

## 2020-06-02 DIAGNOSIS — E78.00 HYPERCHOLESTEROLEMIA: ICD-10-CM

## 2020-06-04 NOTE — TELEPHONE ENCOUNTER
Future Appointments   Date Time Provider Thea Eneida   6/5/2020  2:40 PM Ulices Retana MD [facility]

## 2020-06-05 ENCOUNTER — TELEMEDICINE (OUTPATIENT)
Dept: FAMILY MEDICINE CLINIC | Facility: CLINIC | Age: 55
End: 2020-06-05

## 2020-06-05 VITALS — WEIGHT: 148 LBS | BODY MASS INDEX: 24 KG/M2

## 2020-06-05 DIAGNOSIS — E78.00 HYPERCHOLESTEROLEMIA: Primary | ICD-10-CM

## 2020-06-05 DIAGNOSIS — J45.20 MILD INTERMITTENT ASTHMA WITHOUT COMPLICATION: ICD-10-CM

## 2020-06-05 PROCEDURE — 99213 OFFICE O/P EST LOW 20 MIN: CPT | Performed by: FAMILY MEDICINE

## 2020-06-05 RX ORDER — ROSUVASTATIN CALCIUM 5 MG/1
TABLET, COATED ORAL
Qty: 90 TABLET | Refills: 1 | OUTPATIENT
Start: 2020-06-05

## 2020-06-05 RX ORDER — ROSUVASTATIN CALCIUM 5 MG/1
TABLET, COATED ORAL
Qty: 90 TABLET | Refills: 1 | Status: SHIPPED | OUTPATIENT
Start: 2020-06-05 | End: 2021-03-12

## 2020-06-05 NOTE — PROGRESS NOTES
Ashish Valverde is a 48year old male. Patient presents with:  Hyperlipidemia: refill    This visit is conducted using telemedicine with live interactive video and audio. Abhijit Romero verbally consents to virtual video visit.    Patient understands and acce PLAN:   Gilson Shukla was seen today for hyperlipidemia.     Diagnoses and all orders for this visit:    Hypercholesterolemia  -     Rosuvastatin Calcium 5 MG Oral Tab; TAKE 1 TABLET BY MOUTH EVERY NIGHT    Mild intermittent asthma without complication controlled

## 2020-06-08 ENCOUNTER — TELEPHONE (OUTPATIENT)
Dept: FAMILY MEDICINE CLINIC | Facility: CLINIC | Age: 55
End: 2020-06-08

## 2021-03-12 DIAGNOSIS — E78.00 HYPERCHOLESTEROLEMIA: ICD-10-CM

## 2021-03-12 RX ORDER — ROSUVASTATIN CALCIUM 5 MG/1
TABLET, COATED ORAL
Qty: 90 TABLET | Refills: 1 | Status: SHIPPED | OUTPATIENT
Start: 2021-03-12 | End: 2021-10-12

## 2021-04-23 DIAGNOSIS — E78.00 HYPERCHOLESTEROLEMIA: ICD-10-CM

## 2021-04-23 RX ORDER — ROSUVASTATIN CALCIUM 5 MG/1
TABLET, COATED ORAL
Qty: 90 TABLET | Refills: 1 | OUTPATIENT
Start: 2021-04-23

## 2021-04-23 NOTE — TELEPHONE ENCOUNTER
LOV Visit date not found    LAST LAB    LAST RX    Next OV No future appointments.     PROTOCOL  Name from pharmacy: ROSUVASTATIN 5MG TABLETS          Will file in chart as: ROSUVASTATIN CALCIUM 5 MG Oral Tab    Sig: TAKE 1 TABLET BY MOUTH EVERY NIGHT    Delmi Hilliard

## 2021-10-09 DIAGNOSIS — E78.00 HYPERCHOLESTEROLEMIA: ICD-10-CM

## 2021-10-09 NOTE — TELEPHONE ENCOUNTER
Cholesterol Medication Protocol Failed 10/09/2021 03:24 AM   Protocol Details  ALT < 80    ALT resulted within past year    Lipid panel within past 12 months    Appointment within past 12 or next 3 months        LOV 6/5/2020     LAST LAB  11/5/2019     [place]

## 2021-10-12 RX ORDER — ROSUVASTATIN CALCIUM 5 MG/1
TABLET, COATED ORAL
Qty: 30 TABLET | Refills: 0 | Status: SHIPPED | OUTPATIENT
Start: 2021-10-12 | End: 2021-11-24

## 2021-10-12 NOTE — TELEPHONE ENCOUNTER
Patient is due for annual physical and labs, last labs were done in 2019. Please schedule appointment.  Refilled #30

## 2021-11-24 ENCOUNTER — OFFICE VISIT (OUTPATIENT)
Dept: FAMILY MEDICINE CLINIC | Facility: CLINIC | Age: 56
End: 2021-11-24
Payer: COMMERCIAL

## 2021-11-24 VITALS
BODY MASS INDEX: 24.43 KG/M2 | OXYGEN SATURATION: 98 % | HEIGHT: 66 IN | WEIGHT: 152 LBS | SYSTOLIC BLOOD PRESSURE: 106 MMHG | TEMPERATURE: 98 F | RESPIRATION RATE: 18 BRPM | HEART RATE: 60 BPM | DIASTOLIC BLOOD PRESSURE: 80 MMHG

## 2021-11-24 DIAGNOSIS — Z12.5 SCREENING FOR PROSTATE CANCER: ICD-10-CM

## 2021-11-24 DIAGNOSIS — R68.82 DECREASED LIBIDO: ICD-10-CM

## 2021-11-24 DIAGNOSIS — Z00.00 ROUTINE GENERAL MEDICAL EXAMINATION AT A HEALTH CARE FACILITY: Primary | ICD-10-CM

## 2021-11-24 DIAGNOSIS — E78.00 HYPERCHOLESTEROLEMIA: ICD-10-CM

## 2021-11-24 PROCEDURE — 3074F SYST BP LT 130 MM HG: CPT | Performed by: FAMILY MEDICINE

## 2021-11-24 PROCEDURE — 3079F DIAST BP 80-89 MM HG: CPT | Performed by: FAMILY MEDICINE

## 2021-11-24 PROCEDURE — 3008F BODY MASS INDEX DOCD: CPT | Performed by: FAMILY MEDICINE

## 2021-11-24 PROCEDURE — 99396 PREV VISIT EST AGE 40-64: CPT | Performed by: FAMILY MEDICINE

## 2021-11-24 RX ORDER — ROSUVASTATIN CALCIUM 5 MG/1
5 TABLET, COATED ORAL NIGHTLY
Qty: 90 TABLET | Refills: 3 | Status: SHIPPED | OUTPATIENT
Start: 2021-11-24 | End: 2022-02-22

## 2021-11-30 NOTE — PROGRESS NOTES
Leandra Herbert is a 50year old male   Patient presents with:  Physical    HPI:   Patient is seen for his annual physical and needs his biometric form filled  Colonoscopy done in 2017 was normal  Patient has no concerns this visit.     Asthma: well controll Caffeine Concern: Yes          1 cup        Occupational Exposure: Not Asked        Hobby Hazards: Not Asked        Sleep Concern: Not Asked        Stress Concern: Not Asked        Weight Concern: Not Asked        Special Diet: Not Asked        Back Care: normal.   Left Ear: External ear normal.   Nose: Nose normal.   Mouth/Throat: Oropharynx is clear and moist.   Eyes: Pupils are equal, round, and reactive to light. EOM are normal.   Neck: Neck supple. No thyromegaly present.    Cardiovascular: Normal rate,

## 2022-02-28 DIAGNOSIS — E78.00 HYPERCHOLESTEROLEMIA: ICD-10-CM

## 2022-03-01 RX ORDER — ROSUVASTATIN CALCIUM 5 MG/1
TABLET, COATED ORAL
Qty: 90 TABLET | Refills: 2 | Status: SHIPPED | OUTPATIENT
Start: 2022-03-01 | End: 2023-06-06 | Stop reason: DRUGHIGH

## 2022-06-13 ENCOUNTER — HOSPITAL ENCOUNTER (OUTPATIENT)
Age: 57
Discharge: HOME | End: 2022-06-13
Payer: COMMERCIAL

## 2022-06-13 ENCOUNTER — APPOINTMENT (OUTPATIENT)
Dept: GENERAL RADIOLOGY | Age: 57
End: 2022-06-13
Attending: NURSE PRACTITIONER
Payer: COMMERCIAL

## 2022-06-13 VITALS
HEART RATE: 70 BPM | OXYGEN SATURATION: 99 % | TEMPERATURE: 99 F | RESPIRATION RATE: 14 BRPM | DIASTOLIC BLOOD PRESSURE: 70 MMHG | SYSTOLIC BLOOD PRESSURE: 112 MMHG
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
## 2022-06-13 DIAGNOSIS — S09.90XA CLOSED HEAD INJURY, INITIAL ENCOUNTER: ICD-10-CM

## 2022-06-13 DIAGNOSIS — S70.01XA CONTUSION OF RIGHT HIP, INITIAL ENCOUNTER: ICD-10-CM

## 2022-06-13 DIAGNOSIS — S22.41XA CLOSED FRACTURE OF MULTIPLE RIBS OF RIGHT SIDE, INITIAL ENCOUNTER: Primary | ICD-10-CM

## 2022-06-13 LAB
POCT BILIRUBIN URINE: NEGATIVE
POCT BLOOD URINE: NEGATIVE
POCT GLUCOSE URINE: NEGATIVE MG/DL
POCT KETONE URINE: NEGATIVE MG/DL
POCT LEUKOCYTE ESTERASE URINE: NEGATIVE
POCT NITRITE URINE: NEGATIVE
POCT PH URINE: 6 (ref 5–8)
POCT PROTEIN URINE: NEGATIVE MG/DL
POCT SPECIFIC GRAVITY URINE: 1.02
POCT URINE CLARITY: CLEAR
POCT URINE COLOR: YELLOW
POCT UROBILINOGEN URINE: 0.2 MG/DL

## 2022-06-13 PROCEDURE — 73502 X-RAY EXAM HIP UNI 2-3 VIEWS: CPT | Performed by: NURSE PRACTITIONER

## 2022-06-13 PROCEDURE — 81002 URINALYSIS NONAUTO W/O SCOPE: CPT | Performed by: NURSE PRACTITIONER

## 2022-06-13 PROCEDURE — A9150 MISC/EXPER NON-PRESCRIPT DRU: HCPCS | Performed by: NURSE PRACTITIONER

## 2022-06-13 PROCEDURE — 99214 OFFICE O/P EST MOD 30 MIN: CPT | Performed by: NURSE PRACTITIONER

## 2022-06-13 PROCEDURE — 71101 X-RAY EXAM UNILAT RIBS/CHEST: CPT | Performed by: NURSE PRACTITIONER

## 2022-06-13 RX ORDER — ACETAMINOPHEN 500 MG
1000 TABLET ORAL ONCE
Status: COMPLETED | OUTPATIENT
Start: 2022-06-13 | End: 2022-06-13
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
## 2022-06-22 ENCOUNTER — TELEPHONE (OUTPATIENT)
Dept: FAMILY MEDICINE CLINIC | Facility: CLINIC | Age: 57
End: 2022-06-22

## 2022-06-22 ENCOUNTER — OFFICE VISIT (OUTPATIENT)
Dept: FAMILY MEDICINE CLINIC | Facility: CLINIC | Age: 57
End: 2022-06-22
Payer: COMMERCIAL

## 2022-06-22 VITALS
TEMPERATURE: 97 F | HEIGHT: 66 IN | SYSTOLIC BLOOD PRESSURE: 112 MMHG | RESPIRATION RATE: 16 BRPM | OXYGEN SATURATION: 100 % | BODY MASS INDEX: 23.65 KG/M2 | HEART RATE: 72 BPM | DIASTOLIC BLOOD PRESSURE: 62 MMHG | WEIGHT: 147.13 LBS
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
## 2022-06-22 DIAGNOSIS — S22.41XD CLOSED FRACTURE OF MULTIPLE RIBS OF RIGHT SIDE WITH ROUTINE HEALING, SUBSEQUENT ENCOUNTER: Primary | ICD-10-CM

## 2022-06-22 DIAGNOSIS — S70.01XD CONTUSION OF RIGHT HIP, SUBSEQUENT ENCOUNTER: ICD-10-CM

## 2022-06-22 DIAGNOSIS — S70.01XD HEMATOMA OF RIGHT HIP, SUBSEQUENT ENCOUNTER: ICD-10-CM

## 2022-06-22 PROCEDURE — 3008F BODY MASS INDEX DOCD: CPT | Performed by: FAMILY MEDICINE

## 2022-06-22 PROCEDURE — 3074F SYST BP LT 130 MM HG: CPT | Performed by: FAMILY MEDICINE

## 2022-06-22 PROCEDURE — 3078F DIAST BP <80 MM HG: CPT | Performed by: FAMILY MEDICINE

## 2022-06-22 PROCEDURE — 99214 OFFICE O/P EST MOD 30 MIN: CPT | Performed by: FAMILY MEDICINE

## 2022-06-22 RX ORDER — COVID-19 ANTIGEN TEST
220 KIT MISCELLANEOUS
COMMUNITY

## 2022-06-27 ENCOUNTER — APPOINTMENT (OUTPATIENT)
Dept: ULTRASOUND IMAGING | Age: 57
End: 2022-06-27
Attending: EMERGENCY MEDICINE
Payer: COMMERCIAL

## 2022-06-27 ENCOUNTER — HOSPITAL ENCOUNTER (OUTPATIENT)
Age: 57
Discharge: ANOTHER HOSPITAL | End: 2022-06-27
Payer: COMMERCIAL

## 2022-06-27 ENCOUNTER — HOSPITAL ENCOUNTER (EMERGENCY)
Age: 57
Discharge: HOME | End: 2022-06-27
Attending: EMERGENCY MEDICINE
Payer: COMMERCIAL

## 2022-06-27 VITALS
HEIGHT: 66 IN | RESPIRATION RATE: 18 BRPM | BODY MASS INDEX: 23.3 KG/M2 | WEIGHT: 145 LBS | TEMPERATURE: 99 F | DIASTOLIC BLOOD PRESSURE: 72 MMHG | SYSTOLIC BLOOD PRESSURE: 118 MMHG | OXYGEN SATURATION: 99 % | HEART RATE: 73 BPM

## 2022-06-27 VITALS
WEIGHT: 145 LBS | HEIGHT: 66 IN | OXYGEN SATURATION: 100 % | DIASTOLIC BLOOD PRESSURE: 76 MMHG | RESPIRATION RATE: 18 BRPM | SYSTOLIC BLOOD PRESSURE: 129 MMHG | BODY MASS INDEX: 23.3 KG/M2 | TEMPERATURE: 97 F | HEART RATE: 79 BPM

## 2022-06-27 DIAGNOSIS — M79.89 LEG SWELLING: Primary | ICD-10-CM

## 2022-06-27 DIAGNOSIS — M79.89 RIGHT LEG SWELLING: Primary | ICD-10-CM

## 2022-06-27 PROCEDURE — 99284 EMERGENCY DEPT VISIT MOD MDM: CPT

## 2022-06-27 PROCEDURE — 99215 OFFICE O/P EST HI 40 MIN: CPT | Performed by: NURSE PRACTITIONER

## 2022-06-27 PROCEDURE — 93971 EXTREMITY STUDY: CPT | Performed by: EMERGENCY MEDICINE
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
## 2022-10-13 ENCOUNTER — TELEMEDICINE (OUTPATIENT)
Dept: FAMILY MEDICINE CLINIC | Facility: CLINIC | Age: 57
End: 2022-10-13

## 2022-10-13 ENCOUNTER — TELEPHONE (OUTPATIENT)
Dept: FAMILY MEDICINE CLINIC | Facility: CLINIC | Age: 57
End: 2022-10-13

## 2022-10-13 DIAGNOSIS — U07.1 UPPER RESPIRATORY TRACT INFECTION DUE TO COVID-19 VIRUS: Primary | ICD-10-CM

## 2022-10-13 DIAGNOSIS — J06.9 UPPER RESPIRATORY TRACT INFECTION DUE TO COVID-19 VIRUS: Primary | ICD-10-CM

## 2022-10-13 DIAGNOSIS — J45.20 MILD INTERMITTENT ASTHMA WITHOUT COMPLICATION: ICD-10-CM

## 2022-10-13 PROCEDURE — 99212 OFFICE O/P EST SF 10 MIN: CPT | Performed by: FAMILY MEDICINE

## 2022-10-13 NOTE — TELEPHONE ENCOUNTER
Called patient who states his symptoms started on Tuesday. Tested for covid that day and was negative. Tested yesterday, Wednesday, and covid test was positive. Had been to a Birthday party on Saturday and also his  had tested positive for covid. Denies fever. Has runny nose, congestion and productive cough with yellowish mucus. Denies SOB or wheezing but states his breathing is not 100%. Also has generalized HA and BA. Denies any N/V/D, abd pain or other complaints. Has been taking mucinex, Zyrtec and tylenol. Has Hx of asthma but doesn't feel like he's needed his rescue inhaler. With his history of asthma and age >47, he is asking if he should take Paxlovid. Advised will update Dr. Long Failing and return call. Dr. Long Failing,  Please advise if you can fit patient in for Video Visit to discuss paxlovid or schedule with Dr. Sosa Mendieta. Symptoms are mild at day 3.

## 2022-10-13 NOTE — TELEPHONE ENCOUNTER
Pt called stating he tested Covid positive today. Symptoms:  Headache, Fatigue, aches & pains, a lot of mucus. Please advise.

## 2023-04-17 ENCOUNTER — PATIENT MESSAGE (OUTPATIENT)
Dept: FAMILY MEDICINE CLINIC | Facility: CLINIC | Age: 58
End: 2023-04-17

## 2023-04-18 NOTE — TELEPHONE ENCOUNTER
From: Gabriela Alexis  To: Bart Lujan MD  Sent: 4/17/2023 2:56 PM CDT  Subject: Blood Test - Mac Copeland! Finally getting my blood test this week. Wondering if I need an order; or I just head over to the lab. If an order, could you request an ApoB and Testosterone test as well. Thank you!

## 2023-04-19 LAB
AMB EXT BILIRUBIN URINE: NEGATIVE
AMB EXT BILIRUBIN, TOTAL: 1.7 MG/DL
AMB EXT BLOOD URINE: NEGATIVE
AMB EXT BUN: 19 MG/DL
AMB EXT CHLORIDE: 103
AMB EXT CHOLESTEROL, TOTAL: 256 MG/DL
AMB EXT CMP ALT: 20 U/L
AMB EXT CMP AST: 18 U/L
AMB EXT EGFR NON-AA: 83
AMB EXT GLUCOSE URINE: NEGATIVE
AMB EXT GLUCOSE: 103 MG/DL
AMB EXT GLUCOSE: 103 MG/DL
AMB EXT HDL CHOLESTEROL: 50 MG/DL
AMB EXT HEMATOCRIT: 41.2
AMB EXT HEMOGLOBIN: 14.4
AMB EXT HGBA1C: 5.5 %
AMB EXT KETONES URINE: NEGATIVE
AMB EXT LDL CHOLESTEROL, DIRECT: 182 MG/DL
AMB EXT LEUKOCYTE ESTERASE URINE: NEGATIVE
AMB EXT MCV: 89
AMB EXT NITRITE URINE: NEGATIVE
AMB EXT PH URINE: 6
AMB EXT PLATELETS: 332
AMB EXT POSTASSIUM: 4.7 MMOL/L
AMB EXT PROTEIN URINE: NEGATIVE
AMB EXT RBC URINE: NEGATIVE
AMB EXT SODIUM: 140 MMOL/L
AMB EXT TOTAL PROTEIN: 6.9
AMB EXT TRIGLYCERIDES: 133 MG/DL
AMB EXT URINE COLOR: YELLOW
AMB EXT URINE SPECIFIC GRAVITY: 1.02
AMB EXT UROBILINOGEN URINE: 0.2
AMB EXT VLDL: 24 MG/DL
AMB EXT WBC URINE: NEGATIVE
AMB EXT WBC: 4.1 X10(3)UL

## 2023-04-20 LAB
AMB EXT BILIRUBIN URINE: NEGATIVE
AMB EXT BILIRUBIN, TOTAL: 1.7 MG/DL
AMB EXT BUN: 19 MG/DL
AMB EXT CALCIUM: 10.1
AMB EXT CARBON DIOXIDE: 23
AMB EXT CHLORIDE: 103
AMB EXT CHOLESTEROL, TOTAL: 256 MG/DL
AMB EXT CMP ALT: 20 U/L
AMB EXT CMP AST: 18 U/L
AMB EXT CREATININE: 1.06 MG/DL
AMB EXT EGFR NON-AA: 83
AMB EXT GLUCOSE URINE: NEGATIVE
AMB EXT GLUCOSE: 103 MG/DL
AMB EXT HDL CHOLESTEROL: 50 MG/DL
AMB EXT HEMATOCRIT: 41.2
AMB EXT HEMOGLOBIN: 14.4
AMB EXT HGBA1C: 5.5 %
AMB EXT KETONES URINE: NEGATIVE
AMB EXT LDL CHOLESTEROL, DIRECT: 182 MG/DL
AMB EXT LEUKOCYTE ESTERASE URINE: NEGATIVE
AMB EXT MCV: 89
AMB EXT NITRITE URINE: NEGATIVE
AMB EXT PH URINE: 6
AMB EXT PLATELETS: 332
AMB EXT POSTASSIUM: 4.7 MMOL/L
AMB EXT PROTEIN URINE: NEGATIVE
AMB EXT PSA SCREEN: 2.9 NG/ML
AMB EXT SODIUM: 140 MMOL/L
AMB EXT TRIGLYCERIDES: 133 MG/DL
AMB EXT URINE CLARITY: CLEAR
AMB EXT URINE COLOR: YELLOW
AMB EXT URINE SPECIFIC GRAVITY: 1.02
AMB EXT UROBILINOGEN URINE: 0.2
AMB EXT VLDL: 24 MG/DL
AMB EXT WBC: 4.1 X10(3)UL

## 2023-04-21 NOTE — TELEPHONE ENCOUNTER
Pt scheduled for 6-6-23 @ 8:40 am .  Left VM for Pt - Dr already has her limit pf phys for the morning. Needs to be rescheduled.

## 2023-04-21 NOTE — TELEPHONE ENCOUNTER
Patient has not been seen by me since 2021, needs to schedule appointment for annual physical and to review his labs.

## 2023-06-06 ENCOUNTER — OFFICE VISIT (OUTPATIENT)
Dept: FAMILY MEDICINE CLINIC | Facility: CLINIC | Age: 58
End: 2023-06-06
Payer: COMMERCIAL

## 2023-06-06 ENCOUNTER — TELEPHONE (OUTPATIENT)
Dept: FAMILY MEDICINE CLINIC | Facility: CLINIC | Age: 58
End: 2023-06-06

## 2023-06-06 VITALS
DIASTOLIC BLOOD PRESSURE: 68 MMHG | SYSTOLIC BLOOD PRESSURE: 102 MMHG | OXYGEN SATURATION: 98 % | TEMPERATURE: 97 F | HEART RATE: 83 BPM | BODY MASS INDEX: 23.78 KG/M2 | WEIGHT: 148 LBS | RESPIRATION RATE: 18 BRPM | HEIGHT: 66 IN

## 2023-06-06 DIAGNOSIS — Z80.0 FAMILY HISTORY OF COLON CANCER IN FATHER: ICD-10-CM

## 2023-06-06 DIAGNOSIS — R73.01 IMPAIRED FASTING GLUCOSE: ICD-10-CM

## 2023-06-06 DIAGNOSIS — Z82.49 FAMILY HISTORY OF HEART DISEASE: ICD-10-CM

## 2023-06-06 DIAGNOSIS — Z00.00 ROUTINE GENERAL MEDICAL EXAMINATION AT A HEALTH CARE FACILITY: Primary | ICD-10-CM

## 2023-06-06 DIAGNOSIS — R79.89 LOW TESTOSTERONE IN MALE: ICD-10-CM

## 2023-06-06 DIAGNOSIS — E78.00 HYPERCHOLESTEROLEMIA: ICD-10-CM

## 2023-06-06 DIAGNOSIS — J45.20 MILD INTERMITTENT ASTHMA WITHOUT COMPLICATION: ICD-10-CM

## 2023-06-06 DIAGNOSIS — Z23 NEED FOR VACCINATION: ICD-10-CM

## 2023-06-06 PROCEDURE — 99396 PREV VISIT EST AGE 40-64: CPT | Performed by: FAMILY MEDICINE

## 2023-06-06 PROCEDURE — 99213 OFFICE O/P EST LOW 20 MIN: CPT | Performed by: FAMILY MEDICINE

## 2023-06-06 PROCEDURE — 3078F DIAST BP <80 MM HG: CPT | Performed by: FAMILY MEDICINE

## 2023-06-06 PROCEDURE — 3074F SYST BP LT 130 MM HG: CPT | Performed by: FAMILY MEDICINE

## 2023-06-06 PROCEDURE — 3008F BODY MASS INDEX DOCD: CPT | Performed by: FAMILY MEDICINE

## 2023-06-06 RX ORDER — ALBUTEROL SULFATE 90 UG/1
2 AEROSOL, METERED RESPIRATORY (INHALATION) EVERY 6 HOURS PRN
Qty: 1 EACH | Refills: 2 | Status: SHIPPED | OUTPATIENT
Start: 2023-06-06 | End: 2023-07-06

## 2023-06-06 RX ORDER — ROSUVASTATIN CALCIUM 10 MG/1
10 TABLET, COATED ORAL NIGHTLY
Qty: 90 TABLET | Refills: 3 | Status: SHIPPED | OUTPATIENT
Start: 2023-06-06 | End: 2024-05-31

## 2023-06-06 NOTE — TELEPHONE ENCOUNTER
Pt will be gettig his 1st shingles vaccine and Tdap    Future Appointments   Date Time Provider Thea Monique   6/19/2023  9:30 AM [facility] NURSE [facility]

## 2023-06-06 NOTE — TELEPHONE ENCOUNTER
Dr. Bijan Munson,  Are orders for these vaccines going to be entered with appt today?     Future Appointments   Date Time Provider Thea Monique   6/19/2023  9:30 AM [facility] NURSE [facility]

## 2023-06-07 ENCOUNTER — MED REC SCAN ONLY (OUTPATIENT)
Dept: FAMILY MEDICINE CLINIC | Facility: CLINIC | Age: 58
End: 2023-06-07

## 2023-06-18 DIAGNOSIS — E78.00 HYPERCHOLESTEROLEMIA: ICD-10-CM

## 2023-06-19 RX ORDER — ROSUVASTATIN CALCIUM 5 MG/1
TABLET, COATED ORAL
Qty: 90 TABLET | Refills: 2 | OUTPATIENT
Start: 2023-06-19

## 2023-06-23 ENCOUNTER — NURSE ONLY (OUTPATIENT)
Dept: FAMILY MEDICINE CLINIC | Facility: CLINIC | Age: 58
End: 2023-06-23
Payer: COMMERCIAL

## 2023-06-23 PROCEDURE — 90472 IMMUNIZATION ADMIN EACH ADD: CPT | Performed by: FAMILY MEDICINE

## 2023-06-23 PROCEDURE — 90471 IMMUNIZATION ADMIN: CPT | Performed by: FAMILY MEDICINE

## 2023-06-23 PROCEDURE — 90715 TDAP VACCINE 7 YRS/> IM: CPT | Performed by: FAMILY MEDICINE

## 2023-06-23 PROCEDURE — 90750 HZV VACC RECOMBINANT IM: CPT | Performed by: FAMILY MEDICINE

## 2024-04-04 NOTE — TELEPHONE ENCOUNTER
[facility] requesting return call to schedule a Video Visit this afternoon with Dr. Cheng Lucia to discuss covid symptoms and Paxlovid. Office number given to return call asap to schedule. [Negative] : Heme/Lymph

## 2024-08-15 LAB
AMB EXT BILIRUBIN, TOTAL: 1.2 MG/DL
AMB EXT BUN: 21 MG/DL
AMB EXT CARBON DIOXIDE: 24
AMB EXT CHLORIDE: 105
AMB EXT CHOLESTEROL, TOTAL: 232 MG/DL
AMB EXT CMP ALT: 25 U/L
AMB EXT CMP AST: 18 U/L
AMB EXT CREATININE: 1.04 MG/DL
AMB EXT EGFR NON-AA: 84
AMB EXT GLUCOSE: 102 MG/DL
AMB EXT HDL CHOLESTEROL: 48 MG/DL
AMB EXT HEMATOCRIT: 45.1
AMB EXT HEMOGLOBIN: 14.8
AMB EXT HGBA1C: 5.7 %
AMB EXT LDL CHOLESTEROL, DIRECT: 166 MG/DL
AMB EXT MCV: 95
AMB EXT PLATELETS: 374
AMB EXT POSTASSIUM: 5.2 MMOL/L
AMB EXT SODIUM: 141 MMOL/L
AMB EXT TOTAL PROTEIN: 6.9
AMB EXT TRIGLYCERIDES: 99 MG/DL
AMB EXT WBC: 4.2 X10(3)UL

## 2024-08-17 ENCOUNTER — PATIENT MESSAGE (OUTPATIENT)
Dept: FAMILY MEDICINE CLINIC | Facility: CLINIC | Age: 59
End: 2024-08-17

## 2024-08-20 NOTE — TELEPHONE ENCOUNTER
- 8/15/24 labs from outside lab attached for review. Advised pt in [facility] message to schedule OV to discuss lab results.   - Refill request sent to [facility].  Pt overdue for annual, but directed to schedule OV.  Rx  24, pt taking Rosuvastatin 10mg nightly.        Pt [facility] message:    Dr. LANCASTER - Hope you are doing well; and attaching my latest lab results inclusive of lipids, urinalysis, PSA, and testosterone. Appears to be good progress in total cholesterol (221 vs 256 last year); triglycerides (101 vs 133 last year); LDL (159 vs 182). PSA improved; as did testosterone.    Can you authorize the statin refill; and I will also make an appointment with you to discuss results.
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
## 2024-08-21 NOTE — TELEPHONE ENCOUNTER
Patient past due for annual physical and medication f/u, please schedule appointment and will send a partial refill.

## 2024-08-22 RX ORDER — ROSUVASTATIN CALCIUM 10 MG/1
10 TABLET, COATED ORAL NIGHTLY
Qty: 90 TABLET | Refills: 0 | Status: SHIPPED | OUTPATIENT
Start: 2024-08-22

## 2024-08-22 NOTE — TELEPHONE ENCOUNTER
Patient scheduled first available physical. Added to waitlist to get in sooner     Future Appointments   Date Time Provider Department Center   11/18/2024  1:20 PM Beulah De La Fuente MD [facility]

## 2024-09-05 ENCOUNTER — LAB REQUISITION (OUTPATIENT)
Dept: LAB | Facility: HOSPITAL | Age: 59
End: 2024-09-05
Payer: COMMERCIAL

## 2024-09-05 DIAGNOSIS — M20.22 HALLUX RIGIDUS, LEFT FOOT: ICD-10-CM

## 2024-09-05 PROCEDURE — 88311 DECALCIFY TISSUE: CPT | Performed by: PODIATRIST

## 2024-09-05 PROCEDURE — 88305 TISSUE EXAM BY PATHOLOGIST: CPT | Performed by: PODIATRIST

## 2024-09-16 ENCOUNTER — MED REC SCAN ONLY (OUTPATIENT)
Dept: FAMILY MEDICINE CLINIC | Facility: CLINIC | Age: 59
End: 2024-09-16

## 2024-09-16 ENCOUNTER — OFFICE VISIT (OUTPATIENT)
Dept: FAMILY MEDICINE CLINIC | Facility: CLINIC | Age: 59
End: 2024-09-16
Payer: COMMERCIAL

## 2024-09-16 VITALS
HEART RATE: 74 BPM | OXYGEN SATURATION: 98 % | BODY MASS INDEX: 24.59 KG/M2 | DIASTOLIC BLOOD PRESSURE: 72 MMHG | WEIGHT: 153 LBS | SYSTOLIC BLOOD PRESSURE: 118 MMHG | HEIGHT: 66 IN | RESPIRATION RATE: 18 BRPM | TEMPERATURE: 98 F

## 2024-09-16 DIAGNOSIS — Z00.00 ROUTINE GENERAL MEDICAL EXAMINATION AT A HEALTH CARE FACILITY: Primary | ICD-10-CM

## 2024-09-16 DIAGNOSIS — J45.20 MILD INTERMITTENT ASTHMA WITHOUT COMPLICATION (HCC): ICD-10-CM

## 2024-09-16 DIAGNOSIS — E78.01 FAMILIAL HYPERCHOLESTEREMIA: ICD-10-CM

## 2024-09-16 DIAGNOSIS — Z23 NEED FOR VACCINATION: ICD-10-CM

## 2024-09-16 DIAGNOSIS — R79.89 LOW TESTOSTERONE IN MALE: ICD-10-CM

## 2024-09-16 LAB
Lab: 2 NG/ML
TESTOSTERONE, TOTAL: 266

## 2024-09-16 PROCEDURE — 99396 PREV VISIT EST AGE 40-64: CPT | Performed by: FAMILY MEDICINE

## 2024-09-16 PROCEDURE — 90471 IMMUNIZATION ADMIN: CPT | Performed by: FAMILY MEDICINE

## 2024-09-16 PROCEDURE — 90750 HZV VACC RECOMBINANT IM: CPT | Performed by: FAMILY MEDICINE

## 2024-09-16 PROCEDURE — 99213 OFFICE O/P EST LOW 20 MIN: CPT | Performed by: FAMILY MEDICINE

## 2024-09-16 RX ORDER — ROSUVASTATIN CALCIUM 10 MG/1
10 TABLET, COATED ORAL NIGHTLY
Qty: 90 TABLET | Refills: 2 | Status: SHIPPED | OUTPATIENT
Start: 2024-09-16

## 2024-09-16 NOTE — PROGRESS NOTES
Family Medicine Progress Note    Asher Roberson is a 58 year old male.  ASSESSMENT AND PLAN:  Diagnoses and all orders for this visit:    Routine general medical examination at a health care facility    Need for vaccination  -     Zoster Recombinant Adjuvanted (Shingrix -Shingles) [64743]    Mild intermittent asthma without complication (HCC) well controlled        - continue to use inhaler as needed        - AAP reviewed    Familial hypercholesteremia uncontrolled  -     CT CALCIUM SCORING; Future  -     rosuvastatin 10 MG Oral Tab; Take 1 tablet (10 mg total) by mouth nightly.  -     LDL goal is less than 100  -     discussed with patient if calcium score is elevated will increase the dose of statin.    Low testosterone in male        - discussed symptoms of low testosterone, patient does have decreased libido, does not want to take testosterone at this time.    Age appropriate anticipatory guidance reviewed  Health Maintenance   Topic Date Due    Pneumococcal Vaccine: Birth to 64yrs (1 of 2 - PCV) Never done    Zoster Vaccines (2 of 2) 08/18/2023    Annual Physical  06/06/2024    COVID-19 Vaccine (4 - 2023-24 season) 09/01/2024    Influenza Vaccine (1) 10/01/2024    PSA  04/20/2025    Asthma Control Test  09/16/2025    Colorectal Cancer Screening  07/18/2027    DTaP,Tdap,and Td Vaccines (3 - Td or Tdap) 06/23/2033    Annual Depression Screening  Completed     The patient indicates understanding of these issues and agrees to the plan.  Follow-Up: The patient is asked to Return in about 1 year (around 9/16/2025) for annual, hyperlipidemia.    Adam De La Fuente M.D    9/16/2023     HPI:   Asher Roberson is a 58 year old male with history of hyperlipidemia seen for his annual physical.  Colonoscopy done in 2017 was normal, repeat in 10 years.  Denies any constipation or blood in stool,  family history of colon cancer in mother.  Denies nocturia, no family history of prostate  cancer.    Healthy diet and exercises regularly, has not been able to exercise recently because of left foot surgery.    Compliant with his cholesterol medication and diet.  Tolerating medication well without any side effects.    Asthma has been well-controlled, states only used his inhaler once recently, mostly triggered by allergies.ACT 21    PAST MEDICAL HISTORY:     Past Medical History:    Asthma (HCC)    as child    Nonspecific (abnormal) findings on radiological and other examination of other intrathoracic organs     PAST SURGICAL HISTORY:     Past Surgical History:   Procedure Laterality Date    Colonoscopy  07/18/2017    internal hemorrhoids and diverticula, recheck in 10 yrs.     ALLERGY:     Allergies   Allergen Reactions    Fiberglass HIVES, ITCHING and RASH     MEDICATIONS:     Current Outpatient Medications   Medication Sig Dispense Refill    rosuvastatin 10 MG Oral Tab Take 1 tablet (10 mg total) by mouth nightly. 90 tablet 0    Naproxen Sodium 220 MG Oral Cap Take 220 mg by mouth.       FAMILY HISTORY:     Family History   Problem Relation Age of Onset    Thyroid Disorder Mother         hypothyroidism    Fibromyalgia Mother     High Cholesterol Mother     Colon Cancer Mother     High Cholesterol Father     Other (CAD) Father     Bipolar Disorder Sister     Breast Cancer Maternal Grandmother     Other (alzheimers) Maternal Grandfather     Heart Disorder Paternal Grandfather     Other (CVA) Paternal Grandfather      SOCIAL HISTORY:     Social History     Socioeconomic History    Marital status:    Tobacco Use    Smoking status: Never    Smokeless tobacco: Never   Vaping Use    Vaping status: Never Used   Substance and Sexual Activity    Alcohol use: Yes     Alcohol/week: 0.0 standard drinks of alcohol     Comment: 2x wk beer and wine    Drug use: No    Sexual activity: Yes   Other Topics Concern    Caffeine Concern Yes     Comment: 1 cup    Exercise Yes     Comment: yes yoga     REVIEW OF  SYSTEMS:   Review of Systems   Constitutional:  Negative for appetite change, fatigue, fever and unexpected weight change.   HENT:  Negative for congestion, ear pain, hearing loss and sore throat.    Eyes:  Negative for discharge, redness and visual disturbance.   Respiratory:  Negative for cough, chest tightness and shortness of breath.    Cardiovascular:  Negative for chest pain and palpitations.   Gastrointestinal:  Negative for abdominal pain, blood in stool, constipation and nausea.   Endocrine: Negative for cold intolerance, heat intolerance and polyuria.   Genitourinary:  Negative for difficulty urinating, dysuria, frequency and urgency.   Musculoskeletal:  Negative for arthralgias, gait problem, joint swelling and myalgias.   Skin:  Negative for rash.   Allergic/Immunologic: Negative for food allergies.   Neurological:  Negative for dizziness, weakness, numbness and headaches.   Hematological:  Negative for adenopathy. Does not bruise/bleed easily.   Psychiatric/Behavioral:  Negative for dysphoric mood and sleep disturbance. The patient is not nervous/anxious.         PHYSICAL EXAM:   /72   Pulse 74   Temp 98 °F (36.7 °C) (Temporal)   Resp 18   Ht 5' 6\" (1.676 m)   Wt 153 lb (69.4 kg)   SpO2 98%   BMI 24.69 kg/m²     Physical Exam  Constitutional:       General: He is not in acute distress.     Appearance: Normal appearance. He is well-developed and normal weight.   HENT:      Head: Normocephalic and atraumatic.      Right Ear: Tympanic membrane, ear canal and external ear normal.      Left Ear: Tympanic membrane, ear canal and external ear normal.      Nose: Nose normal.      Mouth/Throat:      Mouth: Mucous membranes are moist.      Pharynx: Oropharynx is clear.   Eyes:      Extraocular Movements: Extraocular movements intact.      Conjunctiva/sclera: Conjunctivae normal.      Pupils: Pupils are equal, round, and reactive to light.   Neck:      Thyroid: No thyromegaly.      Vascular: No  carotid bruit.   Cardiovascular:      Rate and Rhythm: Normal rate and regular rhythm.      Pulses: Normal pulses.      Heart sounds: Normal heart sounds. No murmur heard.  Pulmonary:      Effort: Pulmonary effort is normal. No respiratory distress.      Breath sounds: Normal breath sounds.   Chest:      Chest wall: No tenderness.   Abdominal:      General: Abdomen is flat. Bowel sounds are normal. There is no distension.      Palpations: Abdomen is soft. There is no hepatomegaly, splenomegaly or mass.      Tenderness: There is no abdominal tenderness.      Hernia: No hernia is present.   Musculoskeletal:         General: Normal range of motion.      Cervical back: Normal range of motion and neck supple.      Right lower leg: No edema.      Left lower leg: No edema.   Lymphadenopathy:      Cervical: No cervical adenopathy.   Skin:     General: Skin is warm.      Findings: No rash.   Neurological:      General: No focal deficit present.      Mental Status: He is alert and oriented to person, place, and time.      Cranial Nerves: No cranial nerve deficit.      Sensory: No sensory deficit.      Motor: No weakness.      Coordination: Coordination normal.      Gait: Gait normal.      Deep Tendon Reflexes: Reflexes are normal and symmetric. Reflexes normal.   Psychiatric:         Mood and Affect: Mood normal.         Behavior: Behavior normal.         Thought Content: Thought content normal.         Judgment: Judgment normal.      LABS AND IMAGING:     Component      Latest Ref Rng 8/15/2024   TESTOSTERONE, TOTAL 266 (E)   BUN      mg/dL 21 (E)   Total Bilirubin      mg/dL 1.2 (E)   Sodium      mmol/L 141 (E)   Potassium      mmol/L 5.2 (E)   AST (SGOT)      U/L 18 (E)   ALT (SGPT)      U/L 25 (E)   Chloride 105 (E)   Carbon Dioxide, Total 24 (E)   eGFR NON-AFR. AMERICAN 84 (E)   PROTEIN, TOTAL 6.9 (E)   WBC      x10(3)uL 4.2 (E)   Hemoglobin 14.8 (E)   Hematocrit 45.1 (E)   MCV 95 (E)   Platelet Count 374 (E)    Cholesterol, Total      mg/dL 232 (E)   HDL Cholesterol      mg/dL 48 (E)   DIRECT LDL      mg/dL 166 (E)   Triglycerides      mg/dL 99 (E)   HEMOGLOBIN A1c      % 5.7 (E)   Glucose      mg/dL 102 (E)   CREATININE      mg/dL 1.04 (E)   PROSTATE-SPECIFIC AG, SERUM      ng/mL 2.0 (E)      Legend:  (E) External lab result       The 21st Century Cures Act makes medical notes like these available to patients in the interest of transparency. Please be advised this is a medical document. Medical documents are intended to carry relevant information, facts as evident, and the clinical opinion of the practitioner. The medical note is intended as peer to peer communication and may appear blunt or direct. It is written in medical language and may contain abbreviations or verbiage that are unfamiliar.     Beulah De La Fuente M.D

## 2024-10-10 ENCOUNTER — ORDER TRANSCRIPTION (OUTPATIENT)
Dept: ADMINISTRATIVE | Facility: HOSPITAL | Age: 59
End: 2024-10-10

## 2024-10-10 DIAGNOSIS — Z13.6 SCREENING FOR CARDIOVASCULAR CONDITION: Primary | ICD-10-CM

## 2024-10-12 ENCOUNTER — HOSPITAL ENCOUNTER (OUTPATIENT)
Dept: CT IMAGING | Facility: HOSPITAL | Age: 59
Discharge: HOME | End: 2024-10-12
Attending: FAMILY MEDICINE

## 2024-10-12 DIAGNOSIS — Z13.6 SCREENING FOR CARDIOVASCULAR CONDITION: ICD-10-CM

## 2024-10-12 NOTE — PROGRESS NOTES
Date of Service 10/12/2024    JOSE SMALL  Date of Birth 12/3/1965    Patient Age: 58 year old    PCP: Beulah De La Fuente MD  [address] [address]    Heart Scan Consult  Preliminary Heart Scan Score: 87.8    Previous Screening  Heart Scan Completed Previously: No                   Risk Factors  Personal Risk Factors  Non-alterable Risk Factors: Family History (Father had bypasses in his 70's.)  Alterable Risk Factors: Abnormal Cholesterol;Pre-Diabetes      Body Mass Index  BMI 24    Blood Pressure  /72 no med.  (Normal =< 120/80,  Elevated = 120-129/ >80,  High Stage1 130-139/80-89 , Stage2 >140/>90)    Lipid Profile  Cholesterol: 232, done on 8/15/2024.  HDL Cholesterol: 48, done on 8/15/2024.  LDL Cholesterol: 166, done on 11/5/2019.  TriGlycerides 99, done on 8/15/2024.  He is on 10mg of a statin.  Has been on for years.  In looking back from 2014 to current, his LDL has only been less than 100 in 2018.  Otherwise, has ran between 105 - 182.    Cholesterol Goals  Value   Total  =< 200   HDL  = > 45 Men = > 55 Women   LDL   =< 100   Triglycerides  =< 150       Glucose and Hemoglobin A1C  Discussed Pre Diabetes  - having A1c between 5.7 - 6.4.  Past A1c's have been 5.5  Lab Results   Component Value Date     08/15/2024    A1C 5.7 08/15/2024     (Normal Fasting Glucose < 100mg/dl )    Nurse Review  Risk factor information and results reviewed with Nurse: Yes    Recommended Follow Up:  Consult your physician regarding:: Final Heart Scan Report;Discuss potential for Incidental Finding      Recommendations for Change:  Nutrition Changes: Low Saturated Fat;Increase Fiber    Cholesterol Modification (goal of therapy depends upon your risk): Decrease LDL (Lousy/Bad) Ideal <100    Exercise: No Change Needed    Smoking Cessation: No Change Needed    Weight Management: Maintain Current Weight    Stress Management: Adopt Stress Management Techniques    Repeat Heart Scan: 3 Years if  Calcium Score is > 0.0;Discuss with your Physician              [place] Recommended Resources:  Recommended Resources: PV Screening;Upcoming Classes, Medical Services and Health Library [email or web address]  Recommended PV Screening: Abdomen;Carotids         Reena BHAKTA RN        Please Contact the Nurse Heart Line with any Questions or Concerns [phone].

## 2024-10-13 ENCOUNTER — PATIENT MESSAGE (OUTPATIENT)
Dept: FAMILY MEDICINE CLINIC | Facility: CLINIC | Age: 59
End: 2024-10-13

## 2024-10-18 ENCOUNTER — TELEPHONE (OUTPATIENT)
Dept: FAMILY MEDICINE CLINIC | Facility: CLINIC | Age: 59
End: 2024-10-18

## 2024-10-18 NOTE — TELEPHONE ENCOUNTER
Patient called asked for his CT Calcium Scoring Scoring Test.      He also siad he was told Dr should review his statin Dosage.

## 2024-10-18 NOTE — TELEPHONE ENCOUNTER
FINDINGS:       REGION  CALCIUM SCORE      LEFT MAIN:  14   LEFT ANTERIOR DESCENDIN   CIRCUMFLEX:  0   RIGHT CORONARY ARTERY:    3      TOTAL CALCIUM SCORE:  88       Routing to Dr. De La Fuente for review

## 2024-11-21 ENCOUNTER — PATIENT MESSAGE (OUTPATIENT)
Dept: FAMILY MEDICINE CLINIC | Facility: CLINIC | Age: 59
End: 2024-11-21

## 2024-11-21 DIAGNOSIS — R79.89 LOW TESTOSTERONE IN MALE: Primary | ICD-10-CM

## 2025-01-28 NOTE — PROGRESS NOTES
Urology Clinic Note - New Patient    Referring Provider:  Beulah De La Fuente MD  [address]  [address]  [place] [address]     Primary Care Provider:  Beulah De La Fuente MD     Chief Complaint:   Hypogonadism    HPI:     Asher Roberson is a 59 year old male with history of HLD referred for low T.    Patient with a couple year history of low energy, trouble with muscle mass gain, low libido, occasional erectile dysfunction.  He has had testosterone checked both in 2023 and more recently, both of these were less than 300.  Values as below.  Today, he reports exercising several times per week.  Sleeps well.  Avoids smoking and excessive alcohol use.  No other behavioral issues.  Has 1 child that was adopted.  No other pertinent urologic history.      Labs 8/15/24;   PSA 2.0   Hematocrit 45          PSA:  Lab Results   Component Value Date    PSA 2.0 08/15/2024    PSAS 2.90 04/20/2023    PSAS 2.91 09/28/2018      Last Cr was 1.04 done on 8/15/2024.  Last GFR (SITA) was 84 done on 8/15/2024.      History:     Past Medical History:    Asthma (HCC)    as child    Nonspecific (abnormal) findings on radiological and other examination of other intrathoracic organs       Past Surgical History:   Procedure Laterality Date    Colonoscopy  07/18/2017    internal hemorrhoids and diverticula, recheck in 10 yrs.       Family History   Problem Relation Age of Onset    Thyroid Disorder Mother         hypothyroidism    Fibromyalgia Mother     High Cholesterol Mother     Colon Cancer Mother     High Cholesterol Father     Other (CAD) Father     Bipolar Disorder Sister     Breast Cancer Maternal Grandmother     Other (alzheimers) Maternal Grandfather     Heart Disorder Paternal Grandfather     Other (CVA) Paternal Grandfather        Social History     Socioeconomic History    Marital status:    Tobacco Use    Smoking status: Never    Smokeless tobacco: Never   Vaping Use    Vaping status: Never Used   Substance and  Sexual Activity    Alcohol use: Yes     Alcohol/week: 0.0 standard drinks of alcohol     Comment: 2x wk beer and wine    Drug use: No    Sexual activity: Yes   Other Topics Concern    Caffeine Concern Yes     Comment: 1 cup    Exercise Yes     Comment: yes yoga       Medications (Active prior to today's visit):  Current Outpatient Medications   Medication Sig Dispense Refill    rosuvastatin 10 MG Oral Tab Take 1 tablet (10 mg total) by mouth nightly. 90 tablet 2    Naproxen Sodium 220 MG Oral Cap Take 220 mg by mouth.         Allergies:  Allergies[1]      Review of Systems:   A comprehensive 10-point review of systems was completed.  Pertinent positives and negatives are noted in the the HPI.    Physical Exam:   CONSTITUTIONAL: Well developed, well nourished, in no acute distress  NEUROLOGIC: Alert and oriented  HEAD: Normocephalic, atraumatic  ENT: Hearing intact   RESPIRATORY: Normal respiratory effort  SKIN: No evident rashes  ABDOMEN: Soft, non-tender, non-distended    No results found.      Assessment & Plan:     Hypogonadism    I spoke with him in detail regarding androgen replacement therapy with supplemental testosterone.  I discussed the various methods of delivery including AndroGel, Testopel, testosterone injections.  I discussed the risk of polycythemia and need for monitoring of blood work.  I discussed that at this time there is not definitive evidence whether testosterone increases or decreases the risk of cardiovascular events.  I discussed that research shows there is no increased risk of prostate cancer with ART.  Lastly, I informed him that testosterone replacement therapy can cause infertility all on the medication if he is attempting to have children.    - Labs:  LH, estradiol    T, PSA, hematocrit all completed as above    Once the above results-  we will prescribe testosterone injections and teach him how to inject in clinic (likely start at 50mg)    - Repeat testosterone and Hgb/Hct in 6  weeks (mid-cycle, 3-4 days after an injection)  - Repeat testosterone and Hgb/Hct in every 6-12 months      Thank you for this consult.    I have personally reviewed all relevant medical records, labs, and imaging.     Gary Romero MD  Staff Urologist  [facility]  Office: [phone]           [1]   Allergies  Allergen Reactions    Fiberglass HIVES, ITCHING and RASH

## 2025-01-29 ENCOUNTER — OFFICE VISIT (OUTPATIENT)
Dept: SURGERY | Facility: CLINIC | Age: 60
End: 2025-01-29
Payer: COMMERCIAL

## 2025-01-29 DIAGNOSIS — R79.89 LOW TESTOSTERONE IN MALE: Primary | ICD-10-CM

## 2025-01-29 LAB
APPEARANCE: CLEAR
BILIRUBIN: NEGATIVE
GLUCOSE (URINE DIPSTICK): NEGATIVE MG/DL
KETONES (URINE DIPSTICK): NEGATIVE MG/DL
LEUKOCYTES: NEGATIVE
MULTISTIX LOT#: NORMAL NUMERIC
NITRITE, URINE: NEGATIVE
OCCULT BLOOD: NEGATIVE
PH, URINE: 6 (ref 4.5–8)
PROTEIN (URINE DIPSTICK): NEGATIVE MG/DL
SPECIFIC GRAVITY: 1.01 (ref 1–1.03)
URINE-COLOR: YELLOW
UROBILINOGEN,SEMI-QN: 0.2 MG/DL (ref 0–1.9)

## 2025-01-29 PROCEDURE — 81003 URINALYSIS AUTO W/O SCOPE: CPT | Performed by: UROLOGY

## 2025-01-29 PROCEDURE — 99203 OFFICE O/P NEW LOW 30 MIN: CPT | Performed by: UROLOGY

## 2025-01-31 ENCOUNTER — TELEPHONE (OUTPATIENT)
Dept: SURGERY | Facility: CLINIC | Age: 60
End: 2025-01-31

## 2025-01-31 ENCOUNTER — LAB ENCOUNTER (OUTPATIENT)
Dept: LAB | Age: 60
End: 2025-01-31
Attending: UROLOGY
Payer: COMMERCIAL

## 2025-01-31 DIAGNOSIS — E29.1 HYPOGONADISM IN MALE: Primary | ICD-10-CM

## 2025-01-31 DIAGNOSIS — R79.89 LOW TESTOSTERONE IN MALE: ICD-10-CM

## 2025-01-31 LAB
ESTRADIOL SERPL-MCNC: 23.6 PG/ML
LH SERPL-ACNC: 3.6 MIU/ML

## 2025-01-31 PROCEDURE — 83002 ASSAY OF GONADOTROPIN (LH): CPT

## 2025-01-31 PROCEDURE — 82670 ASSAY OF TOTAL ESTRADIOL: CPT | Performed by: UROLOGY

## 2025-01-31 PROCEDURE — 36415 COLL VENOUS BLD VENIPUNCTURE: CPT | Performed by: UROLOGY

## 2025-01-31 RX ORDER — TESTOSTERONE CYPIONATE 1000 MG/10ML
50 INJECTION, SOLUTION INTRAMUSCULAR
Qty: 2 ML | Refills: 5 | Status: SHIPPED | OUTPATIENT
Start: 2025-01-31 | End: 2025-07-30

## 2025-01-31 NOTE — TELEPHONE ENCOUNTER
Please call patient and set him up for testosterone teaching in 2 to 3 weeks.  I have sent a prescription to his pharmacy.  After his teaching, he should return to see me in 6 weeks with blood work.

## 2025-02-03 ENCOUNTER — TELEPHONE (OUTPATIENT)
Dept: SURGERY | Facility: CLINIC | Age: 60
End: 2025-02-03

## 2025-02-03 ENCOUNTER — PATIENT MESSAGE (OUTPATIENT)
Dept: SURGERY | Facility: CLINIC | Age: 60
End: 2025-02-03

## 2025-02-03 DIAGNOSIS — E29.1 HYPOGONADISM IN MALE: Primary | ICD-10-CM

## 2025-02-03 RX ORDER — TESTOSTERONE CYPIONATE 200 MG/ML
INJECTION, SOLUTION INTRAMUSCULAR
Qty: 10 ML | Refills: 0 | Status: SHIPPED | OUTPATIENT
Start: 2025-02-03

## 2025-02-03 NOTE — TELEPHONE ENCOUNTER
Per patient Yung stated his prescription would not be filled for 6 weeks and asking if something can be done. Please advise

## 2025-02-03 NOTE — TELEPHONE ENCOUNTER
----- Message from Gary Romero sent at 1/31/2025  2:50 PM CST -----  Please call patient and set him up for testosterone teaching in 2 to 3 weeks.  I have sent a prescription to his pharmacy.  After his teaching, he should return to see me in 6 weeks with blood work.

## 2025-02-04 ENCOUNTER — TELEPHONE (OUTPATIENT)
Dept: SURGERY | Facility: CLINIC | Age: 60
End: 2025-02-04

## 2025-02-04 NOTE — TELEPHONE ENCOUNTER
This encounter is now closed.     Patient is on the schedule, 2/7 for nurse visit - T teachings.

## 2025-02-04 NOTE — TELEPHONE ENCOUNTER
Per Elise dougherty [facility] a prior authorization is required for testosterone prescription. Please advise

## 2025-02-07 ENCOUNTER — NURSE ONLY (OUTPATIENT)
Dept: SURGERY | Facility: CLINIC | Age: 60
End: 2025-02-07

## 2025-02-07 ENCOUNTER — NURSE ONLY (OUTPATIENT)
Dept: SURGERY | Facility: CLINIC | Age: 60
End: 2025-02-07
Payer: COMMERCIAL

## 2025-02-07 DIAGNOSIS — R79.89 LOW TESTOSTERONE IN MALE: Primary | ICD-10-CM

## 2025-02-07 DIAGNOSIS — R79.89 LOW TESTOSTERONE: Primary | ICD-10-CM

## 2025-02-07 PROCEDURE — 99211 OFF/OP EST MAY X REQ PHY/QHP: CPT | Performed by: UROLOGY

## 2025-02-07 NOTE — PROGRESS NOTES
Patient is here again for Testosterone teachings. He brought the Testosterone supplies, such as the Testosterone vials, and 23 G needles.     RN first identified the patient and the purpose of his visit. RN instructed patient to prepare all the supplies first, remove the vial of the testosterone from the box, draw air into the syringe by pulling the plunger back to the line that marks the dose he needs to give (0.5 ml), put the needle through the stopper and push the air into the vial then draw back the plunger to the correct angel (0.5 ml). He verbalized understanding.       RN demonstrated to patient first the greater trochanter of the femur and also showed him the anatomical picture of the thigh. RN explained that the top border of the vastus lateralis muscle can be found a hand width below the groin; the bottom border of the vastus lateralis muscle is found a hand above the knee. RN also explained the squeeze the muscle before injection to increase the thickness and depth. Patient was able to locate and demonstrate this as well.     Warned him not to advance the needle deep to touch the bone. Patient is aware.    RN continue to instruct the patient to clean the injection site with alcohol using a circular motion and allow it to dry. RN explained to quickly insert the needle straight into the muscle at a 90 degree angle but aspirate before injection, pull the plunger back to check for blood in the syringe and patient verbalized understanding.     Patient was able to verbally explained and demonstrated the injection process himself. First dose administered at the office succesfully.     RN instructed patient to monitor site for swelling and bruising, to apply ice packs if needed and Ibuprofen for any pain/discomfort. Repeat lab draw due in 6 weeks for T labs and CBC (mid cycle, 3-4 days after an injection and follow up with MD for a telephone visit 6-8 weeks. He verbalized understanding and agreeable to plans.       All questions answered.

## 2025-02-07 NOTE — PROGRESS NOTES
Patient here with the Testosterone 200mg, but no syringes.   RN called pharmacy. No 22G available. Requested 23 G , 1inch  order  Order placed. Patient updated.   May return to office for a nurse visit once needle is available.    Pt's daughter r/c and message given.  Pt's visit changed to v v at 1:30 . awb

## 2025-02-15 ENCOUNTER — OFFICE VISIT (OUTPATIENT)
Dept: FAMILY MEDICINE CLINIC | Facility: CLINIC | Age: 60
End: 2025-02-15
Payer: COMMERCIAL

## 2025-02-15 VITALS
TEMPERATURE: 98 F | HEIGHT: 66 IN | WEIGHT: 156 LBS | RESPIRATION RATE: 18 BRPM | OXYGEN SATURATION: 98 % | HEART RATE: 80 BPM | BODY MASS INDEX: 25.07 KG/M2 | DIASTOLIC BLOOD PRESSURE: 78 MMHG | SYSTOLIC BLOOD PRESSURE: 118 MMHG

## 2025-02-15 DIAGNOSIS — R21 RASH: Primary | ICD-10-CM

## 2025-02-15 DIAGNOSIS — J45.20 MILD INTERMITTENT ASTHMA WITHOUT COMPLICATION (HCC): ICD-10-CM

## 2025-02-15 DIAGNOSIS — E78.00 HYPERCHOLESTEROLEMIA: ICD-10-CM

## 2025-02-15 DIAGNOSIS — R79.89 LOW TESTOSTERONE IN MALE: ICD-10-CM

## 2025-02-15 DIAGNOSIS — S40.021A HEMATOMA OF ARM, RIGHT, INITIAL ENCOUNTER: ICD-10-CM

## 2025-02-15 PROCEDURE — 99214 OFFICE O/P EST MOD 30 MIN: CPT | Performed by: FAMILY MEDICINE

## 2025-02-15 RX ORDER — MOMETASONE FUROATE 1 MG/G
1 CREAM TOPICAL 2 TIMES DAILY PRN
Qty: 15 G | Refills: 0 | Status: SHIPPED | OUTPATIENT
Start: 2025-02-15

## 2025-02-15 NOTE — PATIENT INSTRUCTIONS
Increase dose of rosuvastatin to 20 mg ( 2, 10 mg tabs) nightly.    Monitor hematoma will resolve on its own over time.    Post inflammatory hyperpigmentation will resolve over time, moisturize well.    Avoid red wine if you have flare up of asthma symptoms with it.    If rash does not clear up with mometasone please follow up.

## 2025-02-15 NOTE — PROGRESS NOTES
Family Medicine Progress Note  ASSESSMENT AND PLAN:  Asher Roberson is a 59 year old male who is here for:     Asher was seen today for lab results.    Diagnoses and all orders for this visit:    Rash  Comments:  right arm  Orders:  -     Mometasone Furoate 0.1 % External Cream; Apply 1 Application topically 2 (two) times daily as needed.    Mild intermittent asthma without complication (HCC)     - continue albuterol as needed     - AAP reviewed    Hypercholesterolemia controlled     - continue the same dose of medication     - limit saturated fats and cholesterol in diet    Low testosterone in male      - continue testosterone      - f/u with urology for management    Hematoma of arm, right, initial encounter      - reassured patient will resolve over time       The patient indicates understanding of these issues and agrees to the plan.  Follow-Up: The patient is asked to Return in about 1 year (around 2/15/2026), or if symptoms worsen or fail to improve, for hyperlipidemia.  .     Beulah De La Fuente MD        CC: Lab Results    HPI:   Asher Roberson is a 59 year old male who presents for Lab Results     Red wine triggering his asthma symptoms, has stopped drinking it.    Rash on the right inner elbow for a week and a half, states started after he had his blood draw, states also has a small hematoma at the site of blood draw on the right elbow.    On injectable testosterone from urologist, took 2 doses.    Foot surgery healed well, had titanium screw removed a week ago, states has fibre glass allergy from his cast and had blisters on his foot that healed but has some pigmentation.    Compliant with his cholesterol medication and diet and now that he is on testosterone was told needs to monitor his lipids and RBC.    ALLERGY:     Allergies as of 02/15/2025 - Review Complete 02/15/2025   Allergen Reaction Noted    Fiberglass HIVES, ITCHING, and RASH 09/16/2024     MEDICATIONS:     Current Outpatient  Medications   Medication Sig Dispense Refill    Syringe/Needle, Disp, 23G X 1\" 3 ML Does not apply Misc Use needle/syringe to inject testosterone as instructed every week. 24 each 1    testosterone cypionate 200 mg/mL Intramuscular Solution Inject 0.25 mL (50 mg total) into the muscle every 7 days. 10 mL 0    Testosterone Cypionate 100 MG/ML Intramuscular Solution Inject 0.5 mL (50 mg total) into the muscle every 7 days. 2 mL 5    Syringe/Needle, Disp, 22G X 1\" 3 ML Does not apply Misc Use needle/syringe to inject testosterone as instructed every week. 24 each 1    rosuvastatin 10 MG Oral Tab Take 1 tablet (10 mg total) by mouth nightly. 90 tablet 2    Naproxen Sodium 220 MG Oral Cap Take 220 mg by mouth.        Past Medical History:    Acute, but ill-defined, cerebrovascular disease    Allergic rhinitis    Arthritis    Left knee and foot    Asthma (HCC)    as child    Hyperlipidemia    Nonspecific (abnormal) findings on radiological and other examination of other intrathoracic organs      Social History:  Social History     Socioeconomic History    Marital status:    Tobacco Use    Smoking status: Never    Smokeless tobacco: Never   Vaping Use    Vaping status: Never Used   Substance and Sexual Activity    Alcohol use: Yes     Alcohol/week: 4.0 standard drinks of alcohol     Types: 4 Glasses of wine per week     Comment: 2x wk beer and wine    Drug use: No    Sexual activity: Yes   Other Topics Concern    Caffeine Concern No    Stress Concern No    Weight Concern No    Special Diet No    Exercise Yes    Seat Belt Yes     Social Drivers of Health     Food Insecurity: No Food Insecurity (2/15/2025)    NCSS - Food Insecurity     Worried About Running Out of Food in the Last Year: No     Ran Out of Food in the Last Year: No   Transportation Needs: No Transportation Needs (2/15/2025)    NCSS - Transportation     Lack of Transportation: No   Housing Stability: Not At Risk (2/15/2025)    NCSS - Housing/Utilities      Has Housing: Yes     Worried About Losing Housing: No     Unable to Get Utilities: No        REVIEW OF SYSTEMS:   A comprehensive 10 point review of systems was completed.  Pertinent positives and negatives noted in the the HPI.      EXAM:   /78   Pulse 80   Temp 97.8 °F (36.6 °C) (Temporal)   Resp 18   Ht 5' 6\" (1.676 m)   Wt 156 lb (70.8 kg)   SpO2 98%   BMI 25.18 kg/m²   GENERAL: well developed, well nourished,in no apparent distress  SKIN: maculopapular erythematous rash right elbow area  NECK: supple  LUNGS: clear to auscultation  CARDIO: RRR without murmur    Encounter took 30 min including taking history, performing physical exam, reviewing external test results, counseling and educating patient, answering patient questions and documenting in EHR.    NOTE TO PATIENT: The 21st Century Cures Act makes clinical notes like these available to patients in the interest of transparency. Clinical notes are medical documents used by physicians and care providers to communicate with each other. These documents include medical language and terminology, abbreviations, and treatment information that may sound technical and at times possibly unfamiliar. In addition, at times, the verbiage may appear blunt or direct. These documents are one tool providers use to communicate relevant information and clinical opinions of the care providers in a way that allows common understanding of the clinical context.      Beulah De La Fuente MD

## 2025-02-23 ENCOUNTER — PATIENT MESSAGE (OUTPATIENT)
Dept: SURGERY | Facility: CLINIC | Age: 60
End: 2025-02-23

## 2025-02-23 DIAGNOSIS — E29.1 HYPOGONADISM IN MALE: ICD-10-CM

## 2025-02-25 RX ORDER — TESTOSTERONE CYPIONATE 200 MG/ML
INJECTION, SOLUTION INTRAMUSCULAR
Qty: 10 ML | Refills: 0 | Status: SHIPPED | OUTPATIENT
Start: 2025-02-25

## 2025-02-26 ENCOUNTER — LAB ENCOUNTER (OUTPATIENT)
Dept: LAB | Age: 60
End: 2025-02-26
Attending: UROLOGY
Payer: COMMERCIAL

## 2025-02-26 DIAGNOSIS — E29.1 HYPOGONADISM IN MALE: ICD-10-CM

## 2025-02-26 LAB
ERYTHROCYTE [DISTWIDTH] IN BLOOD BY AUTOMATED COUNT: 12.2 %
HCT VFR BLD AUTO: 41.5 %
HGB BLD-MCNC: 14.6 G/DL
MCH RBC QN AUTO: 31.4 PG (ref 26–34)
MCHC RBC AUTO-ENTMCNC: 35.2 G/DL (ref 31–37)
MCV RBC AUTO: 89.2 FL
PLATELET # BLD AUTO: 328 10(3)UL (ref 150–450)
RBC # BLD AUTO: 4.65 X10(6)UL
TESTOST SERPL-MCNC: 559.33 NG/DL
WBC # BLD AUTO: 5.1 X10(3) UL (ref 4–11)

## 2025-02-26 PROCEDURE — 36415 COLL VENOUS BLD VENIPUNCTURE: CPT

## 2025-02-26 PROCEDURE — 85027 COMPLETE CBC AUTOMATED: CPT

## 2025-02-26 PROCEDURE — 84403 ASSAY OF TOTAL TESTOSTERONE: CPT

## 2025-03-03 ENCOUNTER — PATIENT MESSAGE (OUTPATIENT)
Dept: FAMILY MEDICINE CLINIC | Facility: CLINIC | Age: 60
End: 2025-03-03

## 2025-03-03 DIAGNOSIS — R21 RASH AND OTHER NONSPECIFIC SKIN ERUPTION: Primary | ICD-10-CM

## 2025-03-10 ENCOUNTER — PATIENT MESSAGE (OUTPATIENT)
Dept: FAMILY MEDICINE CLINIC | Facility: CLINIC | Age: 60
End: 2025-03-10

## 2025-03-10 DIAGNOSIS — E78.01 FAMILIAL HYPERCHOLESTEREMIA: ICD-10-CM

## 2025-03-10 RX ORDER — ROSUVASTATIN CALCIUM 20 MG/1
20 TABLET, COATED ORAL NIGHTLY
Qty: 90 TABLET | Refills: 2 | Status: SHIPPED | OUTPATIENT
Start: 2025-03-10

## 2025-03-10 NOTE — TELEPHONE ENCOUNTER
, patient done with 10 mg rosuvastatin. 20 mg pended.     LOV:2/15/25   Increase dose of rosuvastatin to 20 mg ( 2, 10 mg tabs) nightly.

## 2025-03-20 ENCOUNTER — PATIENT MESSAGE (OUTPATIENT)
Dept: SURGERY | Facility: CLINIC | Age: 60
End: 2025-03-20

## 2025-04-08 ENCOUNTER — PATIENT MESSAGE (OUTPATIENT)
Dept: FAMILY MEDICINE CLINIC | Facility: CLINIC | Age: 60
End: 2025-04-08

## 2025-04-08 DIAGNOSIS — M79.672 LEFT FOOT PAIN: Primary | ICD-10-CM

## 2025-05-10 ENCOUNTER — HOSPITAL ENCOUNTER (OUTPATIENT)
Age: 60
Discharge: HOME | End: 2025-05-10
Payer: COMMERCIAL

## 2025-05-10 VITALS
RESPIRATION RATE: 18 BRPM | BODY MASS INDEX: 23.3 KG/M2 | HEART RATE: 82 BPM | TEMPERATURE: 98 F | WEIGHT: 145 LBS | SYSTOLIC BLOOD PRESSURE: 118 MMHG | OXYGEN SATURATION: 99 % | HEIGHT: 66 IN | DIASTOLIC BLOOD PRESSURE: 83 MMHG

## 2025-05-10 DIAGNOSIS — K64.4 EXTERNAL HEMORRHOID: Primary | ICD-10-CM

## 2025-05-10 PROCEDURE — 99213 OFFICE O/P EST LOW 20 MIN: CPT | Performed by: NURSE PRACTITIONER

## 2025-05-10 RX ORDER — HYDROCORTISONE 25 MG/G
1 OINTMENT TOPICAL 2 TIMES DAILY
Qty: 20 G | Refills: 0 | Status: SHIPPED | OUTPATIENT
Start: 2025-05-10

## 2025-05-10 NOTE — ED INITIAL ASSESSMENT (HPI)
Pt has been having some constipation due to pain medication, and now his external hemorrhoid has returned, and is painful

## 2025-05-10 NOTE — ED PROVIDER NOTES
History     Chief Complaint   Patient presents with    Anal Problem       Subjective:   HPI    Asher Asael Roberson, 59 year old male with notable medical history of Hx hemorrhoid who presents with hemorrhoid. Patient reports having constipation issues over the past week and subsequently developing a hemorrhoid. He has attempted Preparation-H w/o resolution. Hx of hemorrhoids needing lanced in the past. Denies abdominal pain, urinary changes, bleeding, fever.      Problem List[1]   Objective:   Past Medical History:    Acute, but ill-defined, cerebrovascular disease    Allergic rhinitis    Arthritis    Left knee and foot    Asthma (HCC)    as child    Hyperlipidemia    Nonspecific (abnormal) findings on radiological and other examination of other intrathoracic organs              Past Surgical History:   Procedure Laterality Date    Colonoscopy  07/18/2017    internal hemorrhoids and diverticula, recheck in 10 yrs.    Colonoscopy  11/1/2018    Other surgical history  9/5/24    Left foot                Social History     Socioeconomic History    Marital status:    Tobacco Use    Smoking status: Never     Passive exposure: Never    Smokeless tobacco: Never   Vaping Use    Vaping status: Never Used   Substance and Sexual Activity    Alcohol use: Yes     Alcohol/week: 4.0 standard drinks of alcohol     Types: 4 Glasses of wine per week     Comment: 2x wk beer and wine    Drug use: No    Sexual activity: Yes   Other Topics Concern    Caffeine Concern No    Stress Concern No    Weight Concern No    Special Diet No    Exercise Yes    Seat Belt Yes     Social Drivers of Health     Food Insecurity: No Food Insecurity (2/15/2025)    NCSS - Food Insecurity     Worried About Running Out of Food in the Last Year: No     Ran Out of Food in the Last Year: No   Transportation Needs: No Transportation Needs (2/15/2025)    NCSS - Transportation     Lack of Transportation: No   Housing Stability: Not At Risk (2/15/2025)     NCSS - Housing/Utilities     Has Housing: Yes     Worried About Losing Housing: No     Unable to Get Utilities: No              Medications Ordered Prior to Encounter[2]      Constitutional and vital signs reviewed.      All other systems reviewed and negative except as noted above.    I have reviewed the family history, social history, allergies, and outpatient medications.     History reviewed from EMR: Encounters, problem list, allergies, medications      Physical Exam     ED Triage Vitals [05/10/25 1000]   /83   Pulse 82   Resp 18   Temp 98.3 °F (36.8 °C)   Temp src Oral   SpO2 99 %   O2 Device None (Room air)       Current:/83   Pulse 82   Temp 98.3 °F (36.8 °C) (Oral)   Resp 18   Ht 167.6 cm (5' 6\")   Wt 65.8 kg   SpO2 99%   BMI 23.40 kg/m²       Physical Exam  Vitals and nursing note reviewed. Exam conducted with a chaperone present.   Constitutional:       General: He is not in acute distress.     Appearance: Normal appearance. He is normal weight. He is not ill-appearing or toxic-appearing.   HENT:      Head: Normocephalic and atraumatic.      Right Ear: External ear normal.      Left Ear: External ear normal.      Nose: Nose normal. No congestion or rhinorrhea.      Mouth/Throat:      Mouth: Mucous membranes are moist.   Eyes:      Extraocular Movements: Extraocular movements intact.      Conjunctiva/sclera: Conjunctivae normal.      Pupils: Pupils are equal, round, and reactive to light.   Cardiovascular:      Rate and Rhythm: Normal rate.      Pulses: Normal pulses.   Pulmonary:      Effort: Pulmonary effort is normal. No respiratory distress.   Genitourinary:     Rectum: External hemorrhoid present.      Comments: Chaperoned by Betty CHAVEZ  Approx 2x1.5cm external hemorrhoid  Musculoskeletal:         General: No swelling, tenderness or signs of injury. Normal range of motion.      Cervical back: Normal range of motion.   Skin:     General: Skin is warm and dry.      Capillary Refill:  Capillary refill takes less than 2 seconds.   Neurological:      General: No focal deficit present.      Mental Status: He is alert and oriented to person, place, and time. Mental status is at baseline.   Psychiatric:         Mood and Affect: Mood normal.         Behavior: Behavior normal.         Thought Content: Thought content normal.         Judgment: Judgment normal.            ED Course     Labs Reviewed - No data to display  No orders to display       Vitals:    05/10/25 1000   BP: 118/83   Pulse: 82   Resp: 18   Temp: 98.3 °F (36.8 °C)   TempSrc: Oral   SpO2: 99%   Weight: 65.8 kg   Height: 167.6 cm (5' 6\")            [facility]        Asher Asael Roberson, 59 year old male with medical history as noted above who presents with external hemorrhoid   - Patient in NAD, VSS   - HPI and exam c/w external hemorrhoid   - Attempted reduction, however, hemorrhoid returns externally   - Supportive care and dietary care discussed   - GI specialist provided       ** See ED course below for additional information on care provided / interventions / notable events throughout patient's encounter.           ** I have independently reviewed the radiology images, clinical lab results, and ECG tracings as described above (if applicable)    ** Concerning co-morbidities possibly affecting complaint / care: Hx external hemorrhoid        Medical Decision Making  Risk  OTC drugs.  Prescription drug management.        Disposition and Plan     Disposition:  Discharge  5/10/2025 10:38 am    Clinical Impression:  1. External hemorrhoid            Home care instructions:    Hemorrhoid care measures   - Topical analgesia: Benzocaine spray / ointment (every 6 hours as needed. Do not use longer than 7 days)   - Bulk forming agents: Metamucil (daily)   - Steroids: Preparation H [or Hydrocortisone - not both] (apply sparingly up to twice daily as needed. Do not use longer than 7 days)   - Stool softener: Colace (1-2 times daily as needed.  Over-the-counter.)   - Use \"donut\" to sit on to avoid direct pressure on rectum   - Sitz Baths a couple times a day to help with pain and inflammation    - - (Sitz bath: fill bathtub with 2-3 inches or warm water. While in the tub, lean backwards so water reaches the entire area. Remain in water for 10-15 minutes, then pat yourself dry (do not rub).   - Follow up with your primary care provider and/or GI specialist if no improvement after 7 days        Follow-up:  Matt Moura MD  [address] [place] [address]  [phone]      Gastrointestinal (GI) specialist          Medications Prescribed:  Current Discharge Medication List        START taking these medications    Details   benzocaine 20 % Rectal Ointment Place 1 Application rectally every 3 (three) hours as needed for Pain.  Qty: 28 g, Refills: 0      hydrocortisone 2.5 % External Ointment Apply 1 Application topically 2 (two) times daily.  Qty: 20 g, Refills: 0    Comments: OK to substitute volume based on availability               Jose R Walter, DNP, APRN, AGACNP-BC, FNP-C, CNL  Adult-Gerontology Acute Care & Family Nurse Practitioner  [facility]      The above patient (and/or guardian) was made aware that an appropriate evaluation has been performed, and that no additional testing is required at this time. In my medical judgment, there is currently no evidence of an immediate life-threatening or surgical condition, therefore discharge is indicated at this time. The patient (and/or guardian) was advised that a small risk still exists that a serious condition could develop. The patient was instructed to arrange close follow-up with their primary care provider (or the referral provider given today). The patient received written and verbal instructions regarding their condition / concerns, demonstrated understanding, and is agreement with the outpatient treatment plan.              [1]   Patient Active Problem List  Diagnosis     Hypercholesterolemia    Mild intermittent asthma without complication (HCC)    Low testosterone in male   [2]   No current facility-administered medications on file prior to encounter.     Current Outpatient Medications on File Prior to Encounter   Medication Sig Dispense Refill    rosuvastatin 20 MG Oral Tab Take 1 tablet (20 mg total) by mouth nightly. 90 tablet 2    testosterone cypionate 200 mg/mL Intramuscular Solution Inject 0.25 mL (50 mg total) into the muscle every 7 days. Must complete repeat labs for additional refills. 10 mL 0    Mometasone Furoate 0.1 % External Cream Apply 1 Application topically 2 (two) times daily as needed. 15 g 0    Testosterone Cypionate 100 MG/ML Intramuscular Solution Inject 0.5 mL (50 mg total) into the muscle every 7 days. 2 mL 5    Naproxen Sodium 220 MG Oral Cap Take 220 mg by mouth.      Syringe/Needle, Disp, 23G X 1\" 3 ML Does not apply Misc Use needle/syringe to inject testosterone as instructed every week. 24 each 1    Syringe/Needle, Disp, 22G X 1\" 3 ML Does not apply Misc Use needle/syringe to inject testosterone as instructed every week. 24 each 1

## 2025-05-10 NOTE — DISCHARGE INSTRUCTIONS
Hemorrhoid care measures   - Topical analgesia: Benzocaine spray / ointment (every 6 hours as needed. Do not use longer than 7 days)   - Bulk forming agents: Metamucil (daily)   - Steroids: Preparation H (apply sparingly up to twice daily as needed. Do not use longer than 7 days)   - Stool softener: Colace (1-2 times daily as needed. Over-the-counter.)   - Use \"donut\" to sit on to avoid direct pressure on rectum   - Sitz Baths a couple times a day to help with pain and inflammation    - - (Sitz bath: fill bathtub with 2-3 inches or warm water. While in the tub, lean backwards so water reaches the entire area. Remain in water for 10-15 minutes, then pat yourself dry (do not rub).   - Follow up with your primary care provider and/or GI specialist if no improvement after 7 days

## 2025-05-12 ENCOUNTER — PATIENT MESSAGE (OUTPATIENT)
Dept: SURGERY | Facility: CLINIC | Age: 60
End: 2025-05-12

## 2025-05-12 DIAGNOSIS — E29.1 HYPOGONADISM IN MALE: ICD-10-CM

## 2025-05-16 RX ORDER — TESTOSTERONE CYPIONATE 1000 MG/10ML
50 INJECTION, SOLUTION INTRAMUSCULAR
Qty: 2 ML | Refills: 0 | Status: SHIPPED | OUTPATIENT
Start: 2025-05-16 | End: 2025-11-12

## 2025-05-22 ENCOUNTER — TELEMEDICINE (OUTPATIENT)
Facility: LOCATION | Age: 60
End: 2025-05-22
Payer: COMMERCIAL

## 2025-05-22 DIAGNOSIS — E29.1 HYPOGONADISM IN MALE: Primary | ICD-10-CM

## 2025-05-22 PROCEDURE — 99213 OFFICE O/P EST LOW 20 MIN: CPT | Performed by: UROLOGY

## 2025-05-22 NOTE — PROGRESS NOTES
Urology Clinic Note  Telemedicine Visit  Audio Only  The patient consented to performing this visit virtually.     Primary Care Provider:  Beulah De La Fuente MD     Chief Complaint:   Hypogonadism    HPI:      Asher Roberson is a 59 year old male with history of HLD referred for low T.     Patient with a couple year history of low energy, trouble with muscle mass gain, low libido, occasional erectile dysfunction.  He has had testosterone checked both in 2023 and more recently, both of these were less than 300.  Patient is consistently exercising, had good sleep habits, was avoiding smoking and excessive alcohol use.  He had 1 adopted child.  No other pertinent urologic history.  Patient established with me in January 2025.  Blood work was rechecked.  He was then initiated on testosterone therapy at 50 mg/week on 2/7/2025.  Repeat blood work was done 2/26/2025-testosterone 559, hematocrit 41.5.  Presents via phone today to discuss symptoms.  Patient reports is doing very well.  He has improvement in the above symptoms significantly.  He is very happy with this.  No side effects at this time.        PSA:  Lab Results   Component Value Date    PSA 2.0 08/15/2024    PSAS 2.90 04/20/2023    PSAS 2.91 09/28/2018        History:     Past Medical History:    Acute, but ill-defined, cerebrovascular disease    Allergic rhinitis    Arthritis    Left knee and foot    Asthma (HCC)    as child    Constipation    Frequent urination    Hemorrhoids    High cholesterol    Hyperlipidemia    Irregular bowel habits    Nonspecific (abnormal) findings on radiological and other examination of other intrathoracic organs    Pain with bowel movements       Past Surgical History:   Procedure Laterality Date    Colonoscopy  07/18/2017    internal hemorrhoids and diverticula, recheck in 10 yrs.    Colonoscopy  11/1/2018    Other surgical history  9/5/24    Left foot       Family History   Problem Relation Age of Onset    Thyroid Disorder  Mother         hypothyroidism    Fibromyalgia Mother     High Cholesterol Mother     Colon Cancer Mother     Hypertension Mother     High Cholesterol Father     Other (CAD) Father     Asthma Father     Cancer Father     Heart Disorder Father     Lipids Father     Colon Cancer Father         58    Ulcerative Colitis Father         76    Bipolar Disorder Sister     Breast Cancer Maternal Grandmother     Other (alzheimers) Maternal Grandfather     Heart Disorder Paternal Grandfather     Other (CVA) Paternal Grandfather     Hypertension Paternal Grandfather     Stroke Paternal Grandfather        Social History     Socioeconomic History    Marital status:    Tobacco Use    Smoking status: Never     Passive exposure: Never    Smokeless tobacco: Never   Vaping Use    Vaping status: Never Used   Substance and Sexual Activity    Alcohol use: Yes     Alcohol/week: 4.0 standard drinks of alcohol     Types: 4 Glasses of wine per week     Comment: 2x wk beer and wine    Drug use: No    Sexual activity: Yes   Other Topics Concern    Caffeine Concern No    Stress Concern No    Weight Concern No    Special Diet No    Exercise Yes    Seat Belt Yes     Social Drivers of Health     Food Insecurity: No Food Insecurity (2/15/2025)    NCSS - Food Insecurity     Worried About Running Out of Food in the Last Year: No     Ran Out of Food in the Last Year: No   Transportation Needs: No Transportation Needs (2/15/2025)    NCSS - Transportation     Lack of Transportation: No   Housing Stability: Not At Risk (2/15/2025)    NCSS - Housing/Utilities     Has Housing: Yes     Worried About Losing Housing: No     Unable to Get Utilities: No       Medications (Active prior to today's visit):  Current Outpatient Medications   Medication Sig Dispense Refill    Testosterone Cypionate 100 MG/ML Intramuscular Solution Inject 0.5 mL (50 mg total) into the muscle every 7 days. 2 mL 0    diclofenac 75 MG Oral Tab EC Take 1 tablet (75 mg total) by  mouth 2 (two) times daily.      Na Sulfate-K Sulfate-Mg Sulf (SUPREP BOWEL PREP KIT) 17.5-3.13-1.6 GM/177ML Oral Solution Take as directed by physician. 1 each 0    benzocaine 20 % Rectal Ointment Place 1 Application rectally every 3 (three) hours as needed for Pain. 28 g 0    hydrocortisone 2.5 % External Ointment Apply 1 Application topically 2 (two) times daily. 20 g 0    Syringe/Needle, Disp, 23G X 1\" 3 ML Does not apply Misc Use needle/syringe to inject testosterone as instructed every week. 24 each 1    rosuvastatin 20 MG Oral Tab Take 1 tablet (20 mg total) by mouth nightly. 90 tablet 2    Mometasone Furoate 0.1 % External Cream Apply 1 Application topically 2 (two) times daily as needed. 15 g 0       Allergies:  Allergies   Allergen Reactions    Fiberglass HIVES, ITCHING and RASH       Review of Systems:   A comprehensive 10-point review of systems was completed.  Pertinent positives and negatives are noted in the the HPI.    Physical Exam:   No physical exam performed during this telephone encounter.    Assessment & Plan:     Hypogonadism    Again discussed the pros and cons of testosterone therapy.  Patient is doing well at this time.  He is due for labs, orders placed (mid cycle, morning).    If labs are stable, will refill TRT and we will plan for 6-month virtual visit with repeat labs including a PSA at that time. All questions answered    In total, 20 minutes were spent on this patient encounter (including chart review, patient history, physical, and counseling, documentation, and communication).        Gary Romero MD  Staff Urologist  [facility]  Office: [phone]

## 2025-05-28 ENCOUNTER — LAB ENCOUNTER (OUTPATIENT)
Dept: LAB | Age: 60
End: 2025-05-28
Attending: UROLOGY
Payer: COMMERCIAL

## 2025-05-28 DIAGNOSIS — E29.1 HYPOGONADISM IN MALE: ICD-10-CM

## 2025-05-28 LAB
ERYTHROCYTE [DISTWIDTH] IN BLOOD BY AUTOMATED COUNT: 11.8 %
HCT VFR BLD AUTO: 43.2 % (ref 39–53)
HGB BLD-MCNC: 14.9 G/DL (ref 13–17.5)
MCH RBC QN AUTO: 30.8 PG (ref 26–34)
MCHC RBC AUTO-ENTMCNC: 34.5 G/DL (ref 31–37)
MCV RBC AUTO: 89.4 FL (ref 80–100)
PLATELET # BLD AUTO: 308 10(3)UL (ref 150–450)
RBC # BLD AUTO: 4.83 X10(6)UL (ref 4.3–5.7)
TESTOST SERPL-MCNC: 775.12 NG/DL (ref 187.72–684.19)
WBC # BLD AUTO: 4.8 X10(3) UL (ref 4–11)

## 2025-05-28 PROCEDURE — 36415 COLL VENOUS BLD VENIPUNCTURE: CPT

## 2025-05-28 PROCEDURE — 84403 ASSAY OF TOTAL TESTOSTERONE: CPT

## 2025-05-28 PROCEDURE — 85027 COMPLETE CBC AUTOMATED: CPT

## 2025-05-29 ENCOUNTER — TELEPHONE (OUTPATIENT)
Dept: SURGERY | Facility: CLINIC | Age: 60
End: 2025-05-29

## 2025-05-29 DIAGNOSIS — E29.1 HYPOGONADISM IN MALE: ICD-10-CM

## 2025-05-29 NOTE — TELEPHONE ENCOUNTER
----- Message from Dedra LANCASTER sent at 5/29/2025  7:21 AM CDT -----    ----- Message -----  From: Gary Romero MD  Sent: 5/28/2025   8:12 PM CDT  To: [facility] ;  Urology Cl#    Please arrange for telehealth in 6mo  Thanks  SD    ----- Message -----  From: Lab, Background User  Sent: 5/28/2025   9:03 AM CDT  To: Gary Romero MD

## 2025-05-30 RX ORDER — TESTOSTERONE CYPIONATE 200 MG/ML
INJECTION, SOLUTION INTRAMUSCULAR
Qty: 10 ML | Refills: 2 | Status: SHIPPED | OUTPATIENT
Start: 2025-05-30

## 2025-06-13 ENCOUNTER — HOSPITAL ENCOUNTER (OUTPATIENT)
Age: 60
Discharge: HOME | End: 2025-06-13
Payer: COMMERCIAL

## 2025-06-13 VITALS
HEART RATE: 78 BPM | DIASTOLIC BLOOD PRESSURE: 77 MMHG | SYSTOLIC BLOOD PRESSURE: 111 MMHG | OXYGEN SATURATION: 99 % | RESPIRATION RATE: 20 BRPM | TEMPERATURE: 98 F

## 2025-06-13 DIAGNOSIS — J06.9 VIRAL URI WITH COUGH: ICD-10-CM

## 2025-06-13 DIAGNOSIS — R05.1 ACUTE COUGH: Primary | ICD-10-CM

## 2025-06-13 LAB
S PYO AG THROAT QL: NEGATIVE
SARS-COV-2 RNA RESP QL NAA+PROBE: NOT DETECTED

## 2025-06-13 PROCEDURE — 99214 OFFICE O/P EST MOD 30 MIN: CPT | Performed by: NURSE PRACTITIONER

## 2025-06-13 PROCEDURE — U0002 COVID-19 LAB TEST NON-CDC: HCPCS | Performed by: NURSE PRACTITIONER

## 2025-06-13 PROCEDURE — 87880 STREP A ASSAY W/OPTIC: CPT | Performed by: NURSE PRACTITIONER

## 2025-06-13 RX ORDER — LIDOCAINE HYDROCHLORIDE 20 MG/ML
5 SOLUTION OROPHARYNGEAL
Qty: 100 ML | Refills: 0 | Status: SHIPPED | OUTPATIENT
Start: 2025-06-13

## 2025-06-13 RX ORDER — BENZONATATE 100 MG/1
100 CAPSULE ORAL 3 TIMES DAILY PRN
Qty: 15 CAPSULE | Refills: 0 | Status: SHIPPED | OUTPATIENT
Start: 2025-06-13

## 2025-06-13 RX ORDER — PREDNISONE 20 MG/1
40 TABLET ORAL DAILY
Qty: 10 TABLET | Refills: 0 | Status: SHIPPED | OUTPATIENT
Start: 2025-06-13 | End: 2025-06-18

## 2025-06-13 NOTE — ED PROVIDER NOTES
Patient Seen in: [facility]       The following individual(s) verbally consented to be recorded using ambient AI listening technology and understand that they can each withdraw their consent to this listening technology at any point by asking the clinician to turn off or pause the recording:    Patient name: Asher Roberson        History  Chief Complaint   Patient presents with    Cold     I have a history of bronchitis complicated by asthma; currently experiencing severe sore throat and phlegm. - Entered by patient     Stated Complaint: Cold - I have a history of bronchitis complicated by asthma; currently experien*    Subjective:   HPI     Asher Roberson is a 59 year old male with asthma who presents with symptoms of a severe cold.    He has been experiencing symptoms of a cold for the past four days, which have progressively worsened. He describes a persistent cough with yellow and green phlegm and a sore throat severe enough to disrupt sleep. He has been using lozenges and drinking tea with honey, but these have not provided significant relief.    He has a history of asthma and notes experiencing borderline asthma attacks with wheezing and shortness of breath. He has been using his inhaler but has not been on any steroids recently. No history of pneumonia, but he mentions having had bronchitis in the past.     He is currently taking Zyrtec D for seasonal allergies. No fever is reported, but he generally does not feel well. He denies smoking.        Objective:     Past Medical History:    Acute, but ill-defined, cerebrovascular disease    Allergic rhinitis    Arthritis    Left knee and foot    Asthma (HCC)    as child    Constipation    Frequent urination    Hemorrhoids    High cholesterol    Hyperlipidemia    Irregular bowel habits    Nonspecific (abnormal) findings on radiological and other examination of other intrathoracic organs    Pain with bowel movements              Past  Surgical History:   Procedure Laterality Date    Colonoscopy  07/18/2017    internal hemorrhoids and diverticula, recheck in 10 yrs.    Colonoscopy  11/1/2018    Other surgical history  9/5/24    Left foot                Social History     Socioeconomic History    Marital status:    Tobacco Use    Smoking status: Never     Passive exposure: Never    Smokeless tobacco: Never   Vaping Use    Vaping status: Never Used   Substance and Sexual Activity    Alcohol use: Yes     Alcohol/week: 4.0 standard drinks of alcohol     Types: 4 Glasses of wine per week     Comment: 2x wk beer and wine    Drug use: No    Sexual activity: Yes   Other Topics Concern    Caffeine Concern No    Stress Concern No    Weight Concern No    Special Diet No    Exercise Yes    Seat Belt Yes     Social Drivers of Health     Food Insecurity: No Food Insecurity (2/15/2025)    NCSS - Food Insecurity     Worried About Running Out of Food in the Last Year: No     Ran Out of Food in the Last Year: No   Transportation Needs: No Transportation Needs (2/15/2025)    NCSS - Transportation     Lack of Transportation: No   Housing Stability: Not At Risk (2/15/2025)    NCSS - Housing/Utilities     Has Housing: Yes     Worried About Losing Housing: No     Unable to Get Utilities: No              Review of Systems    Positive for stated complaint: Cold - I have a history of bronchitis complicated by asthma; currently experien*  Other systems are as noted in HPI.  Constitutional and vital signs reviewed.      All other systems reviewed and negative except as noted above.    Physical Exam    ED Triage Vitals [06/13/25 1346]   /77   Pulse 78   Resp 20   Temp 98.2 °F (36.8 °C)   Temp src Oral   SpO2 99 %   O2 Device None (Room air)       Current Vitals:   Vital Signs  BP: 111/77  Pulse: 78  Resp: 20  Temp: 98.2 °F (36.8 °C)  Temp src: Oral    Oxygen Therapy  SpO2: 99 %  O2 Device: None (Room air)            Physical Exam  Vitals and nursing note  reviewed.   Constitutional:       General: He is not in acute distress.     Appearance: Normal appearance. He is not ill-appearing or toxic-appearing.   HENT:      Head: Normocephalic and atraumatic.      Right Ear: Tympanic membrane, ear canal and external ear normal.      Left Ear: Tympanic membrane, ear canal and external ear normal.      Nose: Nose normal.      Mouth/Throat:      Mouth: Mucous membranes are moist.      Pharynx: Oropharynx is clear. No pharyngeal swelling or posterior oropharyngeal erythema.   Eyes:      Pupils: Pupils are equal, round, and reactive to light.   Cardiovascular:      Rate and Rhythm: Normal rate and regular rhythm.      Pulses: Normal pulses.   Pulmonary:      Effort: Pulmonary effort is normal. No respiratory distress.      Breath sounds: Normal breath sounds.      Comments: Lungs clear.  No adventitious lung sounds. No wheezing.  No distress.  No hypoxia.  Pulse ox 99% ra. Which is normal    Abdominal:      General: Abdomen is flat.      Palpations: Abdomen is soft.   Musculoskeletal:         General: No signs of injury. Normal range of motion.      Cervical back: Normal range of motion and neck supple.   Skin:     General: Skin is warm and dry.      Capillary Refill: Capillary refill takes less than 2 seconds.   Neurological:      General: No focal deficit present.      Mental Status: He is alert and oriented to person, place, and time.      GCS: GCS eye subscore is 4. GCS verbal subscore is 5. GCS motor subscore is 6.   Psychiatric:         Mood and Affect: Mood normal.         Behavior: Behavior normal.         Thought Content: Thought content normal.         Judgment: Judgment normal.         ED Course  Labs Reviewed   POCT RAPID STREP - Normal   RAPID SARS-COV-2 BY PCR - Normal     Recent Results (from the past 24 hours)   Rapid SARS-CoV-2 by PCR    Collection Time: 06/13/25  1:53 PM    Specimen: Nares; Other   Result Value Ref Range    Rapid SARS-CoV-2 by PCR Not Detected  Not Detected   POCT Rapid Strep    Collection Time: 06/13/25  2:00 PM   Result Value Ref Range    POCT Rapid Strep Negative Negative          MDM         Medical Decision Making  Differential diagnoses reflecting the complexity of care include: Strep, viral pharyngitis, viral URI, COVID, pneumonia, bronchitis  Patient with cough, congestion, sore throat.  Associated asthma symptoms.  Well-appearing on exam.  No current asthma symptoms.  Lungs are clear, no wheezing.  No distress.  No hypoxia.  Pulse ox 99% room air which is normal.  No suspicion for pneumonia.  We did discuss the option of chest x-ray but patient is agreeable to holding off at this time  COVID is negative  Strep is negative.  Benign oropharynx exam.  No evidence of PTA.  Symptoms appear viral    Plan of Care: Rx Tessalon, prednisone, viscous lidocaine.  Continue using albuterol inhaler as needed for asthma symptoms.  Continue Tylenol, Motrin, hot tea with honey, throat lozenges.  Recheck with primary physician if no improvement    Results and plan of care discussed with the patient/family. They are in agreement with discharge. They understand to follow up with their primary doctor or the referral physician for further evaluation, especially if no improvement.  Also discussed the limitations of immediate care, patient is aware that if symptoms are worse they should go to the emergency room. Verbal and written discharge instructions were given.     My independent interpretation of studies of: COVID and strep negative  Diagnostic tests and medications considered but not ordered were: No indication for antibiotics  Shared decision making was done by: Patient agreeable to hold off on  chest x-ray at this time  Comorbidities that add complexity to management include: Asthma  External chart review was done and was noted: Reviewed, noncontributory  History obtained by an independent source was from: N/A  Discussions and management was done with: N/A  Social  determinants of health that affect care: N/A              Problems Addressed:  Acute cough: acute illness or injury  Viral URI with cough: acute illness or injury    Amount and/or Complexity of Data Reviewed  Labs: ordered. Decision-making details documented in ED Course.    Risk  OTC drugs.  Prescription drug management.        Disposition and Plan     Clinical Impression:  1. Acute cough    2. Viral URI with cough         Disposition:  Discharge  6/13/2025  2:12 pm    Follow-up:  Beulah De La Fuente MD  [address] [place] [address]  [phone]                Medications Prescribed:  Discharge Medication List as of 6/13/2025  2:16 PM        START taking these medications    Details   benzonatate 100 MG Oral Cap Take 1 capsule (100 mg total) by mouth 3 (three) times daily as needed for cough., Normal, Disp-15 capsule, R-0      predniSONE 20 MG Oral Tab Take 2 tablets (40 mg total) by mouth daily for 5 days., Normal, Disp-10 tablet, R-0      Lidocaine Viscous HCl 2 % Mouth/Throat Solution Take 5 mL by mouth every 3 (three) hours as needed for Pain., Normal, Disp-100 mL, R-0                   Supplementary Documentation:

## 2025-06-13 NOTE — DISCHARGE INSTRUCTIONS
COVID and strep are negative.  Symptoms appear viral.  Continue to use your inhaler as needed for cough, wheezing, shortness of breath.  Use benzonatate for cough.  Take the steroid daily.  Gargle lidocaine for throat pain.  Continue to use the lozenges.  Drink plenty of fluids, water, hot tea with honey.  Recheck with your primary physician if no improvement

## (undated) NOTE — ED AVS SNAPSHOT
[facility] Emergency Department    [place]  [address] [place] [address]    Phone:  [phone]    Fax:  [phone]   MRN: [id number]    Department:  [facility] Emergency Department   Date of Visit:  4/2/2 Click [email or web address]      Or call [phone]    If you have any problems with your follow-up, please call our  at [phone]    Si usted tiene algun problema con boles sequimiento, por favor llame a nuestro adminstrador de casos al (55 24-Hour Pharmacies        Pharmacy Address Phone Number   [address] ([address]) [place] [address].  ([facility] discharge instructions in [employer] by going to Visits < Admission Summaries. If you've been to the Emergency Department or your doctor's office, you can view your past visit information in [employer] by going to Visits < Visit Summaries. [employer] questions?

## (undated) NOTE — LETTER
ASTHMA ACTION PLAN for Artur Men     : 12/3/1965     Date: 2021  Provider:  Chucky Otero MD  Phone for doctor or clinic: [address] [place] [address], [address] [facility] [phone]

## (undated) NOTE — Clinical Note
2017    Patient: Artur Lowery  : 12/3/1965 Visit date: 4/3/2017    Dear  Dr. Hemalatha Griffin MD,    Thank you for referring Artur Lowery to my practice. Please find my assessment and plan below.            Assessment   Thrombosed external hemorrhoid defect, I yielded minimal results. He has a anoscopy revealing internal components in the left lateral, right anterolateral, and right posterior lateral positions. There are grade 3. There are no other thrombosis events.   Prostate is normal.  There are

## (undated) NOTE — LETTER
ASTHMA ACTION PLAN for Chase Boland     : 12/3/1965     Date: 2020  Provider:  Nikolai Peraza MD  Phone for doctor or clinic: [address] [facility], [address] [address] [facility] [phone]

## (undated) NOTE — LETTER
12/06/19        Rosalba Collet  [address] [address]      Dear Ashlie Andino,    [address] [facility] records indicate that you have outstanding lab work and or testing that was ordered for you and has not yet been completed:  Orders Placed This Encounter

## (undated) NOTE — LETTER
ASTHMA ACTION PLAN for Asher Roberson     : 12/3/1965     Date: 2024  Provider:  Beulah De La Fuente MD  Phone for doctor or clinic: [facility], [address] [place] [address] [phone]    ACT Score: 21      You can use the colors of a traffic light to help learn about your asthma medicines.      1. Green - Go! % of Personal Best Peak Flow Use controller medicine.   Breathing is good  No cough or wheeze  Can work and play Medicine How much to take When to take it    No medications needed because of good control.       2. Yellow - Caution. 50-79% Personal Best Peak  Flow.  Use reliever medicine to keep an asthma attack from getting bad.   Cough  Wheezing  Tight Chest  Wake up at night Medicine How much to take When to take it    Albuterol inhaler,  2 puffs every four hours as needed.        Additional instructions         3. Red - Stop! Danger!  <50% Personal Best Peak  Flow. Take these medications until  Get help from a doctor   Medicine not helping  Breathing is hard and fast  Nose opens wide  Can't walk  Ribs show  Can't talk well Medicine How much to take When to take it    Go to the nearest Emergency Room/Department right now!      Additional Instructions If your symptoms do not improve and you cannot contact your doctor, [facility] or call 911 immediately!     [x] Asthma Action Plan reviewed with patient (and caregiver if necessary) and a copy of the plan was given to the patient/caregiver.   [] Asthma Action Plan reviewed with patient (and caregiver if necessary) on the phone and mailed copy to patient or submitted via [employer].     Signatures:  Provider  Beulah De La Fuente MD   Patient Caretaker

## (undated) NOTE — MR AVS SNAPSHOT
[facility]  [address] [address] [phone]               Thank you for choosing us for your health care visit with Patito Black MD.  We are glad to serve you and happy to provide you with this summary of you He has no abdominal pain or distention. He does not suffer weight loss as a symptom. He has no significant family history of cancer of the colon or colon polyps. He has no cancer of the uterus.   This includes any of his first-degree or second-degree r Current Medications          This list is accurate as of: 4/3/17  5:00 PM.  Always use your most recent med list.                PEG 3350-KCl-Na Bicarb-NaCl 420 g Solr   Starting at 4:00 pm the night before procedure, drink 8 ounces of the prep every 15-20

## (undated) NOTE — LETTER
10/28/19        Татьяна Ng  [address] [address]      Dear Harlan Fernandez,    [address] [facility] records indicate that you have outstanding lab work and or testing that was ordered for you and has not yet been completed:  Orders Placed This Encounter

## (undated) NOTE — ED AVS SNAPSHOT
Patneisha Torres   MRN: [id number]    Department:  [facility] Emergency Department   Date of Visit:  10/13/2018           Disclosure     Insurance plans vary and the physician(s) referred by the ER may not be covered by your plan.  Please contact you tell this physician (or your personal doctor if your instructions are to return to your personal doctor) about any new or lasting problems. The primary care or specialist physician will see patients referred from the [facility] Emergency Department.  Cheryle Levins

## (undated) NOTE — LETTER
ASTHMA ACTION PLAN for Jennifer Regalado     : 12/3/1965     Date: 2018  Provider:  Tim Morejon MD  Phone for doctor or clinic: [facility] [address] [address] [facility] [phone]

## (undated) NOTE — LETTER
ASTHMA ACTION PLAN for Carri Tellez     : 12/3/1965     Date: 2019  Provider:  Mady Sanchez MD  Phone for doctor or clinic: [facility], [address] [address] [facility]# [facility] [phone]

## (undated) NOTE — LETTER
ASTHMA ACTION PLAN for Jennifer Regalado     : 12/3/1965     Date: 3/12/2020  Provider:  Tim Morejon MD  Phone for doctor or clinic: [facility], [address] [address] [facility]# [facility]  [facility] [phone]

## (undated) NOTE — LETTER
05/03/18        Phillips Fails  [address]      Dear Dash Dumas,    [address] records indicate that you have outstanding lab work and or testing that was ordered for you and has not yet been completed:          Hepatic Function Panel (7)

## (undated) NOTE — LETTER
ASTHMA ACTION PLAN for Seth Zacarias     : 12/3/1965     Date: 2023  Provider:  Kathie Francis MD  Phone for doctor or clinic: [facility], [address], [address] [facility]  [place] [address]  Kamlesh Wolf5 can use the colors of a traffic light to help learn about your asthma medicines. 1. Green - Go! % of Personal Best Peak Flow Use controller medicine. Breathing is good  No cough or wheeze  Can work and play Medicine How much to take When to take it    No medications needed because of good control. 2. Yellow - Caution. 50-79% Personal Best Peak  Flow. Use reliever medicine to keep an asthma attack from getting bad. Cough  Wheezing  Tight Chest  Wake up at night Medicine How much to take When to take it    Albuterol inhaler,  2 puffs every four hours as needed. Additional instructions         3. Red - Stop! Danger!  <50% Personal Best Peak  Flow. Take these medications until  Get help from a doctor   Medicine not helping  Breathing is hard and fast  Nose opens wide  Can't walk  Ribs show  Can't talk well Medicine How much to take When to take it    Go to the nearest Emergency Room/Department right now! Additional Instructions If your symptoms do not improve and you cannot contact your doctor, [facility] or call 911 immediately! [x] Asthma Action Plan reviewed with patient (and caregiver if necessary) and a copy of the plan was given to the patient/caregiver. [] Asthma Action Plan reviewed with patient (and caregiver if necessary) on the phone and mailed copy to patient or submitted via [address]      Signatures:  Provider  Kathie Francis MD   Patient Caretaker

## (undated) NOTE — LETTER
01/16/18        Jennifer Regalado  [address] [address]      Dear Chelsea Flores,    [facility] records indicate that you have outstanding lab work and or testing that was ordered for you and has not yet been completed:          Hepatic Function Panel (7)

## (undated) NOTE — LETTER
12/29/21        Bryson Gayle  [address] [address]      Dear Collette Footman,    [address] [facility] records indicate that you have outstanding lab work and or testing that was ordered for you and has not yet been completed:  Orders Placed This Encounter

## (undated) NOTE — ED AVS SNAPSHOT
[facility] Emergency Department    [place]  [address] [address]    Phone:  [phone]    Fax:  [phone]   MRN: [id number]    Department:  [facility] Emergency Department   Date of Visit:  4/2/2 IF THERE IS ANY CHANGE OR WORSENING OF YOUR CONDITION, CALL YOUR PRIMARY CARE PHYSICIAN AT ONCE OR RETURN IMMEDIATELY TO THE EMERGENCY DEPARTMENT.     If you have been prescribed any medication(s), please fill your prescription right away and begin taking t